# Patient Record
Sex: MALE | Race: WHITE | Employment: OTHER | URBAN - METROPOLITAN AREA
[De-identification: names, ages, dates, MRNs, and addresses within clinical notes are randomized per-mention and may not be internally consistent; named-entity substitution may affect disease eponyms.]

---

## 2017-11-29 PROBLEM — M25.519 SHOULDER PAIN: Status: ACTIVE | Noted: 2017-11-29

## 2017-11-29 PROBLEM — G56.31: Status: ACTIVE | Noted: 2017-11-29

## 2017-11-29 PROBLEM — E66.9 OBESITY: Status: ACTIVE | Noted: 2017-11-29

## 2018-04-04 PROBLEM — S22.41XA CLOSED FRACTURE OF MULTIPLE RIBS OF RIGHT SIDE: Status: ACTIVE | Noted: 2018-04-04

## 2018-04-04 PROBLEM — R39.9 LOWER URINARY TRACT SYMPTOMS: Status: ACTIVE | Noted: 2018-04-04

## 2018-04-16 ENCOUNTER — HOSPITAL ENCOUNTER (OUTPATIENT)
Dept: CT IMAGING | Age: 58
Discharge: HOME | End: 2018-04-16
Attending: INTERNAL MEDICINE
Payer: COMMERCIAL

## 2018-04-16 DIAGNOSIS — R10.31 RIGHT LOWER QUADRANT ABDOMINAL PAIN: ICD-10-CM

## 2018-04-16 PROBLEM — R74.01 TRANSAMINITIS: Status: ACTIVE | Noted: 2018-04-16

## 2018-04-16 PROBLEM — M62.08 DIASTASIS RECTI: Status: ACTIVE | Noted: 2018-04-16

## 2018-04-16 PROCEDURE — 74177 CT ABD & PELVIS W/CONTRAST: CPT

## 2018-04-16 PROCEDURE — 74011000258 HC RX REV CODE- 258: Performed by: INTERNAL MEDICINE

## 2018-04-16 PROCEDURE — 74011636320 HC RX REV CODE- 636/320: Performed by: INTERNAL MEDICINE

## 2018-04-16 RX ORDER — SODIUM CHLORIDE 0.9 % (FLUSH) 0.9 %
10 SYRINGE (ML) INJECTION
Status: COMPLETED | OUTPATIENT
Start: 2018-04-16 | End: 2018-04-16

## 2018-04-16 RX ADMIN — DIATRIZOATE MEGLUMINE AND DIATRIZOATE SODIUM 15 ML: 660; 100 LIQUID ORAL; RECTAL at 17:56

## 2018-04-16 RX ADMIN — SODIUM CHLORIDE 100 ML: 900 INJECTION, SOLUTION INTRAVENOUS at 17:56

## 2018-04-16 RX ADMIN — IOPAMIDOL 100 ML: 755 INJECTION, SOLUTION INTRAVENOUS at 17:56

## 2018-04-16 RX ADMIN — Medication 10 ML: at 17:56

## 2018-04-17 PROBLEM — K57.32 CECAL DIVERTICULITIS: Status: ACTIVE | Noted: 2018-04-16

## 2018-12-03 PROBLEM — R74.01 TRANSAMINITIS: Status: RESOLVED | Noted: 2018-04-16 | Resolved: 2018-12-03

## 2018-12-03 PROBLEM — K57.32 CECAL DIVERTICULITIS: Status: RESOLVED | Noted: 2018-04-16 | Resolved: 2018-12-03

## 2018-12-03 PROBLEM — S22.41XA CLOSED FRACTURE OF MULTIPLE RIBS OF RIGHT SIDE: Status: RESOLVED | Noted: 2018-04-04 | Resolved: 2018-12-03

## 2019-06-13 PROBLEM — Z87.19 HISTORY OF COLONIC DIVERTICULITIS: Status: ACTIVE | Noted: 2019-06-13

## 2020-03-06 ENCOUNTER — HOSPITAL ENCOUNTER (OUTPATIENT)
Dept: CT IMAGING | Age: 60
Discharge: HOME | End: 2020-03-06
Attending: INTERNAL MEDICINE
Payer: COMMERCIAL

## 2020-03-06 DIAGNOSIS — R10.31 RLQ ABDOMINAL PAIN: ICD-10-CM

## 2020-03-06 LAB — CREAT BLD-MCNC: 1.3 MG/DL (ref 0.8–1.5)

## 2020-03-06 PROCEDURE — 74011000258 HC RX REV CODE- 258: Performed by: INTERNAL MEDICINE

## 2020-03-06 PROCEDURE — 82565 ASSAY OF CREATININE: CPT

## 2020-03-06 PROCEDURE — 74177 CT ABD & PELVIS W/CONTRAST: CPT

## 2020-03-06 PROCEDURE — 74011250636 HC RX REV CODE- 250/636: Performed by: INTERNAL MEDICINE

## 2020-03-06 PROCEDURE — 74011636320 HC RX REV CODE- 636/320: Performed by: INTERNAL MEDICINE

## 2020-03-06 RX ORDER — HEPARIN 100 UNIT/ML
500 SYRINGE INTRAVENOUS ONCE
Status: COMPLETED | OUTPATIENT
Start: 2020-03-06 | End: 2020-03-06

## 2020-03-06 RX ORDER — SODIUM CHLORIDE 0.9 % (FLUSH) 0.9 %
10 SYRINGE (ML) INJECTION
Status: COMPLETED | OUTPATIENT
Start: 2020-03-06 | End: 2020-03-06

## 2020-03-06 RX ADMIN — DIATRIZOATE MEGLUMINE AND DIATRIZOATE SODIUM 15 ML: 660; 100 LIQUID ORAL; RECTAL at 13:19

## 2020-03-06 RX ADMIN — Medication 10 ML: at 13:19

## 2020-03-06 RX ADMIN — HEPARIN SODIUM (PORCINE) LOCK FLUSH IV SOLN 100 UNIT/ML 500 UNITS: 100 SOLUTION at 14:42

## 2020-03-06 RX ADMIN — IOPAMIDOL 100 ML: 755 INJECTION, SOLUTION INTRAVENOUS at 13:19

## 2020-03-06 RX ADMIN — SODIUM CHLORIDE 100 ML: 900 INJECTION, SOLUTION INTRAVENOUS at 13:19

## 2020-06-11 ENCOUNTER — HOSPITAL ENCOUNTER (OUTPATIENT)
Dept: CT IMAGING | Age: 60
Discharge: HOME | End: 2020-06-11
Attending: INTERNAL MEDICINE
Payer: COMMERCIAL

## 2020-06-11 DIAGNOSIS — R93.89 ABNORMAL CT SCAN: ICD-10-CM

## 2020-06-11 DIAGNOSIS — R19.7 DIARRHEA IN ADULT PATIENT: ICD-10-CM

## 2020-06-11 LAB — CREAT BLD-MCNC: 1.2 MG/DL (ref 0.8–1.5)

## 2020-06-11 PROCEDURE — 82565 ASSAY OF CREATININE: CPT

## 2020-06-11 PROCEDURE — 74011636320 HC RX REV CODE- 636/320: Performed by: INTERNAL MEDICINE

## 2020-06-11 PROCEDURE — 74011000258 HC RX REV CODE- 258: Performed by: INTERNAL MEDICINE

## 2020-06-11 PROCEDURE — 74177 CT ABD & PELVIS W/CONTRAST: CPT

## 2020-06-11 PROCEDURE — 74011000255 HC RX REV CODE- 255: Performed by: INTERNAL MEDICINE

## 2020-06-11 RX ORDER — SODIUM CHLORIDE 0.9 % (FLUSH) 0.9 %
10 SYRINGE (ML) INJECTION
Status: COMPLETED | OUTPATIENT
Start: 2020-06-11 | End: 2020-06-11

## 2020-06-11 RX ADMIN — Medication 10 ML: at 09:41

## 2020-06-11 RX ADMIN — IOPAMIDOL 100 ML: 755 INJECTION, SOLUTION INTRAVENOUS at 09:41

## 2020-06-11 RX ADMIN — BARIUM SULFATE 1350 ML: 1 SUSPENSION ORAL at 09:41

## 2020-06-11 RX ADMIN — SODIUM CHLORIDE 100 ML: 900 INJECTION, SOLUTION INTRAVENOUS at 09:41

## 2020-07-09 PROCEDURE — 88305 TISSUE EXAM BY PATHOLOGIST: CPT

## 2020-07-10 ENCOUNTER — HOSPITAL ENCOUNTER (OUTPATIENT)
Dept: LAB | Age: 60
Discharge: HOME | End: 2020-07-10

## 2020-07-23 ENCOUNTER — HOSPITAL ENCOUNTER (INPATIENT)
Age: 60
LOS: 7 days | Discharge: HOME | DRG: 177 | End: 2020-07-30
Attending: EMERGENCY MEDICINE | Admitting: HOSPITALIST
Payer: COMMERCIAL

## 2020-07-23 ENCOUNTER — APPOINTMENT (OUTPATIENT)
Dept: GENERAL RADIOLOGY | Age: 60
DRG: 177 | End: 2020-07-23
Attending: EMERGENCY MEDICINE
Payer: COMMERCIAL

## 2020-07-23 DIAGNOSIS — R09.02 HYPOXIA: ICD-10-CM

## 2020-07-23 DIAGNOSIS — U07.1 COVID-19 VIRUS INFECTION: ICD-10-CM

## 2020-07-23 DIAGNOSIS — J96.01 ACUTE RESPIRATORY FAILURE WITH HYPOXIA (HCC): ICD-10-CM

## 2020-07-23 DIAGNOSIS — D46.9 MDS (MYELODYSPLASTIC SYNDROME) (HCC): ICD-10-CM

## 2020-07-23 DIAGNOSIS — J12.82 PNEUMONIA DUE TO COVID-19 VIRUS: Primary | ICD-10-CM

## 2020-07-23 DIAGNOSIS — U07.1 COVID-19: ICD-10-CM

## 2020-07-23 DIAGNOSIS — E66.09 CLASS 1 OBESITY DUE TO EXCESS CALORIES WITHOUT SERIOUS COMORBIDITY WITH BODY MASS INDEX (BMI) OF 31.0 TO 31.9 IN ADULT: ICD-10-CM

## 2020-07-23 DIAGNOSIS — U07.1 PNEUMONIA DUE TO COVID-19 VIRUS: Primary | ICD-10-CM

## 2020-07-23 DIAGNOSIS — D64.9 ANEMIA, UNSPECIFIED TYPE: ICD-10-CM

## 2020-07-23 DIAGNOSIS — R91.8 PULMONARY INFILTRATES ON CXR: ICD-10-CM

## 2020-07-23 DIAGNOSIS — C90.01 MULTIPLE MYELOMA IN REMISSION (HCC): ICD-10-CM

## 2020-07-23 PROBLEM — R51.9 HEADACHE: Status: ACTIVE | Noted: 2020-07-23

## 2020-07-23 LAB
ALBUMIN SERPL-MCNC: 3 G/DL (ref 3.2–4.6)
ALBUMIN/GLOB SERPL: 0.8 {RATIO} (ref 1.2–3.5)
ALP SERPL-CCNC: 156 U/L (ref 50–136)
ALT SERPL-CCNC: 25 U/L (ref 12–65)
ANION GAP SERPL CALC-SCNC: 9 MMOL/L (ref 7–16)
AST SERPL-CCNC: 28 U/L (ref 15–37)
BASOPHILS # BLD: 0 K/UL (ref 0–0.2)
BASOPHILS NFR BLD: 0 % (ref 0–2)
BILIRUB SERPL-MCNC: 1 MG/DL (ref 0.2–1.1)
BUN SERPL-MCNC: 29 MG/DL (ref 8–23)
CALCIUM SERPL-MCNC: 8.9 MG/DL (ref 8.3–10.4)
CHLORIDE SERPL-SCNC: 107 MMOL/L (ref 98–107)
CO2 SERPL-SCNC: 24 MMOL/L (ref 21–32)
CREAT SERPL-MCNC: 1.5 MG/DL (ref 0.8–1.5)
CRP SERPL-MCNC: 19.9 MG/DL (ref 0–0.9)
DIFFERENTIAL METHOD BLD: ABNORMAL
EOSINOPHIL # BLD: 0 K/UL (ref 0–0.8)
EOSINOPHIL NFR BLD: 0 % (ref 0.5–7.8)
ERYTHROCYTE [DISTWIDTH] IN BLOOD BY AUTOMATED COUNT: 19.2 % (ref 11.9–14.6)
GLOBULIN SER CALC-MCNC: 3.9 G/DL (ref 2.3–3.5)
GLUCOSE SERPL-MCNC: 133 MG/DL (ref 65–100)
HCT VFR BLD AUTO: 23 % (ref 41.1–50.3)
HGB BLD-MCNC: 8.1 G/DL (ref 13.6–17.2)
IMM GRANULOCYTES # BLD AUTO: 0.1 K/UL (ref 0–0.5)
IMM GRANULOCYTES NFR BLD AUTO: 1 % (ref 0–5)
LYMPHOCYTES # BLD: 0.3 K/UL (ref 0.5–4.6)
LYMPHOCYTES NFR BLD: 3 % (ref 13–44)
MCH RBC QN AUTO: 33.3 PG (ref 26.1–32.9)
MCHC RBC AUTO-ENTMCNC: 35.2 G/DL (ref 31.4–35)
MCV RBC AUTO: 94.7 FL (ref 79.6–97.8)
MONOCYTES # BLD: 0.3 K/UL (ref 0.1–1.3)
MONOCYTES NFR BLD: 3 % (ref 4–12)
NEUTS SEG # BLD: 9.2 K/UL (ref 1.7–8.2)
NEUTS SEG NFR BLD: 93 % (ref 43–78)
NRBC # BLD: 0 K/UL (ref 0–0.2)
PLATELET # BLD AUTO: 184 K/UL (ref 150–450)
PMV BLD AUTO: 10.3 FL (ref 9.4–12.3)
POTASSIUM SERPL-SCNC: 3.8 MMOL/L (ref 3.5–5.1)
PROT SERPL-MCNC: 6.9 G/DL (ref 6.3–8.2)
RBC # BLD AUTO: 2.43 M/UL (ref 4.23–5.6)
SODIUM SERPL-SCNC: 140 MMOL/L (ref 136–145)
TROPONIN-HIGH SENSITIVITY: 7.8 PG/ML (ref 0–14)
WBC # BLD AUTO: 9.9 K/UL (ref 4.3–11.1)

## 2020-07-23 PROCEDURE — 84484 ASSAY OF TROPONIN QUANT: CPT

## 2020-07-23 PROCEDURE — 74011000258 HC RX REV CODE- 258: Performed by: HOSPITALIST

## 2020-07-23 PROCEDURE — 96374 THER/PROPH/DIAG INJ IV PUSH: CPT

## 2020-07-23 PROCEDURE — 74011250637 HC RX REV CODE- 250/637: Performed by: HOSPITALIST

## 2020-07-23 PROCEDURE — 99285 EMERGENCY DEPT VISIT HI MDM: CPT

## 2020-07-23 PROCEDURE — 86140 C-REACTIVE PROTEIN: CPT

## 2020-07-23 PROCEDURE — 86580 TB INTRADERMAL TEST: CPT | Performed by: HOSPITALIST

## 2020-07-23 PROCEDURE — 65270000029 HC RM PRIVATE

## 2020-07-23 PROCEDURE — 74011250636 HC RX REV CODE- 250/636: Performed by: EMERGENCY MEDICINE

## 2020-07-23 PROCEDURE — 85025 COMPLETE CBC W/AUTO DIFF WBC: CPT

## 2020-07-23 PROCEDURE — 93005 ELECTROCARDIOGRAM TRACING: CPT | Performed by: EMERGENCY MEDICINE

## 2020-07-23 PROCEDURE — 87040 BLOOD CULTURE FOR BACTERIA: CPT

## 2020-07-23 PROCEDURE — 74011000302 HC RX REV CODE- 302: Performed by: HOSPITALIST

## 2020-07-23 PROCEDURE — 74011250636 HC RX REV CODE- 250/636: Performed by: HOSPITALIST

## 2020-07-23 PROCEDURE — 71045 X-RAY EXAM CHEST 1 VIEW: CPT

## 2020-07-23 PROCEDURE — 80053 COMPREHEN METABOLIC PANEL: CPT

## 2020-07-23 RX ORDER — LORAZEPAM 0.5 MG/1
0.5 TABLET ORAL
Status: DISCONTINUED | OUTPATIENT
Start: 2020-07-23 | End: 2020-07-30 | Stop reason: HOSPADM

## 2020-07-23 RX ORDER — ACETAMINOPHEN 325 MG/1
650 TABLET ORAL
Status: DISCONTINUED | OUTPATIENT
Start: 2020-07-23 | End: 2020-07-30 | Stop reason: HOSPADM

## 2020-07-23 RX ORDER — TRAMADOL HYDROCHLORIDE 50 MG/1
50 TABLET ORAL
Status: DISCONTINUED | OUTPATIENT
Start: 2020-07-23 | End: 2020-07-30 | Stop reason: HOSPADM

## 2020-07-23 RX ORDER — UREA 10 %
220 LOTION (ML) TOPICAL DAILY
Status: DISCONTINUED | OUTPATIENT
Start: 2020-07-24 | End: 2020-07-25

## 2020-07-23 RX ORDER — ONDANSETRON 4 MG/1
4 TABLET, ORALLY DISINTEGRATING ORAL
Status: DISCONTINUED | OUTPATIENT
Start: 2020-07-23 | End: 2020-07-30 | Stop reason: HOSPADM

## 2020-07-23 RX ORDER — PANTOPRAZOLE SODIUM 40 MG/1
40 TABLET, DELAYED RELEASE ORAL
Status: DISCONTINUED | OUTPATIENT
Start: 2020-07-24 | End: 2020-07-30 | Stop reason: HOSPADM

## 2020-07-23 RX ORDER — SODIUM CHLORIDE 0.9 % (FLUSH) 0.9 %
5-40 SYRINGE (ML) INJECTION EVERY 8 HOURS
Status: DISCONTINUED | OUTPATIENT
Start: 2020-07-23 | End: 2020-07-30 | Stop reason: HOSPADM

## 2020-07-23 RX ORDER — FLUTICASONE PROPIONATE 50 MCG
2 SPRAY, SUSPENSION (ML) NASAL DAILY
Status: DISCONTINUED | OUTPATIENT
Start: 2020-07-24 | End: 2020-07-30 | Stop reason: HOSPADM

## 2020-07-23 RX ORDER — SODIUM CHLORIDE 0.9 % (FLUSH) 0.9 %
5-40 SYRINGE (ML) INJECTION AS NEEDED
Status: DISCONTINUED | OUTPATIENT
Start: 2020-07-23 | End: 2020-07-30 | Stop reason: HOSPADM

## 2020-07-23 RX ORDER — LORATADINE 10 MG/1
10 TABLET ORAL DAILY
Status: DISCONTINUED | OUTPATIENT
Start: 2020-07-24 | End: 2020-07-30 | Stop reason: HOSPADM

## 2020-07-23 RX ORDER — DEXAMETHASONE SODIUM PHOSPHATE 100 MG/10ML
6 INJECTION INTRAMUSCULAR; INTRAVENOUS DAILY
Status: DISCONTINUED | OUTPATIENT
Start: 2020-07-24 | End: 2020-07-30 | Stop reason: HOSPADM

## 2020-07-23 RX ORDER — DEXAMETHASONE SODIUM PHOSPHATE 100 MG/10ML
6 INJECTION INTRAMUSCULAR; INTRAVENOUS
Status: COMPLETED | OUTPATIENT
Start: 2020-07-23 | End: 2020-07-23

## 2020-07-23 RX ORDER — FOLIC ACID 1 MG/1
1 TABLET ORAL DAILY
Status: DISCONTINUED | OUTPATIENT
Start: 2020-07-24 | End: 2020-07-30 | Stop reason: HOSPADM

## 2020-07-23 RX ORDER — ALBUTEROL SULFATE 0.83 MG/ML
2.5 SOLUTION RESPIRATORY (INHALATION)
Status: DISCONTINUED | OUTPATIENT
Start: 2020-07-23 | End: 2020-07-30 | Stop reason: HOSPADM

## 2020-07-23 RX ORDER — DIPHENHYDRAMINE HCL 25 MG
25 CAPSULE ORAL
Status: DISCONTINUED | OUTPATIENT
Start: 2020-07-23 | End: 2020-07-30 | Stop reason: HOSPADM

## 2020-07-23 RX ORDER — BUDESONIDE 3 MG/1
9 CAPSULE, COATED PELLETS ORAL DAILY
Status: DISCONTINUED | OUTPATIENT
Start: 2020-07-24 | End: 2020-07-30 | Stop reason: HOSPADM

## 2020-07-23 RX ORDER — ASCORBIC ACID 500 MG
500 TABLET ORAL DAILY
Status: DISCONTINUED | OUTPATIENT
Start: 2020-07-24 | End: 2020-07-30 | Stop reason: HOSPADM

## 2020-07-23 RX ORDER — BISACODYL 5 MG
5 TABLET, DELAYED RELEASE (ENTERIC COATED) ORAL DAILY PRN
Status: DISCONTINUED | OUTPATIENT
Start: 2020-07-23 | End: 2020-07-30 | Stop reason: HOSPADM

## 2020-07-23 RX ORDER — MONTELUKAST SODIUM 4 MG/1
1 TABLET, CHEWABLE ORAL 2 TIMES DAILY
Status: DISCONTINUED | OUTPATIENT
Start: 2020-07-24 | End: 2020-07-30 | Stop reason: HOSPADM

## 2020-07-23 RX ADMIN — CEFTRIAXONE SODIUM 2 G: 2 INJECTION, POWDER, FOR SOLUTION INTRAMUSCULAR; INTRAVENOUS at 22:36

## 2020-07-23 RX ADMIN — AZITHROMYCIN MONOHYDRATE 500 MG: 500 INJECTION, POWDER, LYOPHILIZED, FOR SOLUTION INTRAVENOUS at 23:14

## 2020-07-23 RX ADMIN — Medication 10 ML: at 22:40

## 2020-07-23 RX ADMIN — TUBERCULIN PURIFIED PROTEIN DERIVATIVE 5 UNITS: 5 INJECTION, SOLUTION INTRADERMAL at 23:12

## 2020-07-23 RX ADMIN — ACETAMINOPHEN 650 MG: 325 TABLET, FILM COATED ORAL at 23:12

## 2020-07-23 RX ADMIN — LORAZEPAM 0.5 MG: 0.5 TABLET ORAL at 23:11

## 2020-07-23 RX ADMIN — DEXAMETHASONE SODIUM PHOSPHATE 6 MG: 10 INJECTION INTRAMUSCULAR; INTRAVENOUS at 20:17

## 2020-07-23 NOTE — ED TRIAGE NOTES
Pt ambulatory to triage wearing a mask. Pt was tested for COVID 07/16/20 and tested positive. Pt reports he's had symptoms since 07/14/20. Pt reports he has multiple myeloma and is still getting treatments. Pt reports increased SOB and coughing today. Pt's Sp02 85% on RA. Pt placed on 2 L oxygen via NC and Sp02 improves to 92%. Pt reports he has also had CP, body aches, fever, abdominal pain, diarrhea, headaches. Pt reports he contracted COVID from his wife who works at [employer]. Pt reports he had Tylenol Sinus about 1.5 hours ago. Pt reports he feels unable to take a deep breath \"because it just sends me into a coughing fit. \"

## 2020-07-24 LAB
ABO + RH BLD: NORMAL
ALBUMIN SERPL-MCNC: 2.8 G/DL (ref 3.2–4.6)
ALBUMIN/GLOB SERPL: 0.8 {RATIO} (ref 1.2–3.5)
ALP SERPL-CCNC: 149 U/L (ref 50–136)
ALT SERPL-CCNC: 24 U/L (ref 12–65)
ANION GAP SERPL CALC-SCNC: 10 MMOL/L (ref 7–16)
AST SERPL-CCNC: 29 U/L (ref 15–37)
ATRIAL RATE: 81 BPM
BASOPHILS # BLD: 0 K/UL (ref 0–0.2)
BASOPHILS NFR BLD: 0 % (ref 0–2)
BILIRUB SERPL-MCNC: 0.6 MG/DL (ref 0.2–1.1)
BLOOD GROUP ANTIBODIES SERPL: NORMAL
BUN SERPL-MCNC: 26 MG/DL (ref 8–23)
CALCIUM SERPL-MCNC: 8.7 MG/DL (ref 8.3–10.4)
CALCULATED P AXIS, ECG09: 44 DEGREES
CALCULATED R AXIS, ECG10: 2 DEGREES
CALCULATED T AXIS, ECG11: 40 DEGREES
CHLORIDE SERPL-SCNC: 109 MMOL/L (ref 98–107)
CO2 SERPL-SCNC: 23 MMOL/L (ref 21–32)
CREAT SERPL-MCNC: 1.13 MG/DL (ref 0.8–1.5)
CRP SERPL-MCNC: 20.8 MG/DL (ref 0–0.9)
DIAGNOSIS, 93000: NORMAL
DIFFERENTIAL METHOD BLD: ABNORMAL
EOSINOPHIL # BLD: 0 K/UL (ref 0–0.8)
EOSINOPHIL NFR BLD: 0 % (ref 0.5–7.8)
ERYTHROCYTE [DISTWIDTH] IN BLOOD BY AUTOMATED COUNT: 18.9 % (ref 11.9–14.6)
FERRITIN SERPL-MCNC: 1934 NG/ML (ref 8–388)
FIBRINOGEN PPP-MCNC: 771 MG/DL (ref 190–501)
GLOBULIN SER CALC-MCNC: 3.6 G/DL (ref 2.3–3.5)
GLUCOSE SERPL-MCNC: 140 MG/DL (ref 65–100)
HCT VFR BLD AUTO: 21.2 % (ref 41.1–50.3)
HCT VFR BLD AUTO: 23.8 % (ref 41.1–50.3)
HGB BLD-MCNC: 7.2 G/DL (ref 13.6–17.2)
HGB BLD-MCNC: 8.2 G/DL (ref 13.6–17.2)
IMM GRANULOCYTES # BLD AUTO: 0 K/UL (ref 0–0.5)
IMM GRANULOCYTES NFR BLD AUTO: 1 % (ref 0–5)
LDH SERPL L TO P-CCNC: 358 U/L (ref 110–210)
LYMPHOCYTES # BLD: 0.2 K/UL (ref 0.5–4.6)
LYMPHOCYTES NFR BLD: 3 % (ref 13–44)
MAGNESIUM SERPL-MCNC: 2.1 MG/DL (ref 1.8–2.4)
MCH RBC QN AUTO: 32.7 PG (ref 26.1–32.9)
MCHC RBC AUTO-ENTMCNC: 34 G/DL (ref 31.4–35)
MCV RBC AUTO: 96.4 FL (ref 79.6–97.8)
MM INDURATION POC: NORMAL (ref 0–5)
MONOCYTES # BLD: 0.2 K/UL (ref 0.1–1.3)
MONOCYTES NFR BLD: 3 % (ref 4–12)
NEUTS SEG # BLD: 6.4 K/UL (ref 1.7–8.2)
NEUTS SEG NFR BLD: 94 % (ref 43–78)
NRBC # BLD: 0 K/UL (ref 0–0.2)
P-R INTERVAL, ECG05: 122 MS
PLATELET # BLD AUTO: 175 K/UL (ref 150–450)
PMV BLD AUTO: 10.5 FL (ref 9.4–12.3)
POTASSIUM SERPL-SCNC: 4.2 MMOL/L (ref 3.5–5.1)
PPD POC: NORMAL
PROCALCITONIN SERPL-MCNC: 0.94 NG/ML
PROT SERPL-MCNC: 6.4 G/DL (ref 6.3–8.2)
Q-T INTERVAL, ECG07: 418 MS
QRS DURATION, ECG06: 128 MS
QTC CALCULATION (BEZET), ECG08: 485 MS
RBC # BLD AUTO: 2.2 M/UL (ref 4.23–5.6)
SODIUM SERPL-SCNC: 142 MMOL/L (ref 136–145)
SPECIMEN EXP DATE BLD: NORMAL
VENTRICULAR RATE, ECG03: 81 BPM
WBC # BLD AUTO: 6.8 K/UL (ref 4.3–11.1)

## 2020-07-24 PROCEDURE — 80053 COMPREHEN METABOLIC PANEL: CPT

## 2020-07-24 PROCEDURE — 86900 BLOOD TYPING SEROLOGIC ABO: CPT

## 2020-07-24 PROCEDURE — 85025 COMPLETE CBC W/AUTO DIFF WBC: CPT

## 2020-07-24 PROCEDURE — 85018 HEMOGLOBIN: CPT

## 2020-07-24 PROCEDURE — 84145 PROCALCITONIN (PCT): CPT

## 2020-07-24 PROCEDURE — 83615 LACTATE (LD) (LDH) ENZYME: CPT

## 2020-07-24 PROCEDURE — 85384 FIBRINOGEN ACTIVITY: CPT

## 2020-07-24 PROCEDURE — 65270000029 HC RM PRIVATE

## 2020-07-24 PROCEDURE — 74011250637 HC RX REV CODE- 250/637: Performed by: HOSPITALIST

## 2020-07-24 PROCEDURE — 74011000258 HC RX REV CODE- 258: Performed by: INTERNAL MEDICINE

## 2020-07-24 PROCEDURE — 74011250636 HC RX REV CODE- 250/636: Performed by: HOSPITALIST

## 2020-07-24 PROCEDURE — 86140 C-REACTIVE PROTEIN: CPT

## 2020-07-24 PROCEDURE — 82728 ASSAY OF FERRITIN: CPT

## 2020-07-24 PROCEDURE — 83735 ASSAY OF MAGNESIUM: CPT

## 2020-07-24 PROCEDURE — 74011250637 HC RX REV CODE- 250/637: Performed by: INTERNAL MEDICINE

## 2020-07-24 PROCEDURE — 74011250636 HC RX REV CODE- 250/636: Performed by: INTERNAL MEDICINE

## 2020-07-24 RX ORDER — FUROSEMIDE 10 MG/ML
40 INJECTION INTRAMUSCULAR; INTRAVENOUS 2 TIMES DAILY
Status: DISCONTINUED | OUTPATIENT
Start: 2020-07-24 | End: 2020-07-30 | Stop reason: HOSPADM

## 2020-07-24 RX ORDER — ENOXAPARIN SODIUM 100 MG/ML
40 INJECTION SUBCUTANEOUS EVERY 24 HOURS
Status: DISCONTINUED | OUTPATIENT
Start: 2020-07-24 | End: 2020-07-30 | Stop reason: HOSPADM

## 2020-07-24 RX ORDER — SODIUM CHLORIDE 9 MG/ML
250 INJECTION, SOLUTION INTRAVENOUS AS NEEDED
Status: DISCONTINUED | OUTPATIENT
Start: 2020-07-24 | End: 2020-07-30 | Stop reason: HOSPADM

## 2020-07-24 RX ORDER — POTASSIUM CHLORIDE 20 MEQ/1
40 TABLET, EXTENDED RELEASE ORAL
Status: COMPLETED | OUTPATIENT
Start: 2020-07-24 | End: 2020-07-24

## 2020-07-24 RX ADMIN — Medication 500 MG: at 08:07

## 2020-07-24 RX ADMIN — MULTIPLE VITAMINS W/ MINERALS TAB 1 TABLET: TAB at 08:07

## 2020-07-24 RX ADMIN — PANTOPRAZOLE SODIUM 40 MG: 40 TABLET, DELAYED RELEASE ORAL at 06:07

## 2020-07-24 RX ADMIN — FUROSEMIDE 40 MG: 10 INJECTION, SOLUTION INTRAMUSCULAR; INTRAVENOUS at 17:33

## 2020-07-24 RX ADMIN — BUDESONIDE 9 MG: 3 CAPSULE, GELATIN COATED ORAL at 08:07

## 2020-07-24 RX ADMIN — Medication 10 ML: at 06:06

## 2020-07-24 RX ADMIN — Medication 220 MG: at 08:07

## 2020-07-24 RX ADMIN — CEFTRIAXONE SODIUM 2 G: 2 INJECTION, POWDER, FOR SOLUTION INTRAMUSCULAR; INTRAVENOUS at 21:54

## 2020-07-24 RX ADMIN — DOXYCYCLINE 100 MG: 100 INJECTION, POWDER, LYOPHILIZED, FOR SOLUTION INTRAVENOUS at 08:07

## 2020-07-24 RX ADMIN — ACETAMINOPHEN 650 MG: 325 TABLET, FILM COATED ORAL at 17:38

## 2020-07-24 RX ADMIN — POTASSIUM CHLORIDE 40 MEQ: 20 TABLET, EXTENDED RELEASE ORAL at 08:07

## 2020-07-24 RX ADMIN — FLUTICASONE PROPIONATE 2 SPRAY: 50 SPRAY, METERED NASAL at 08:08

## 2020-07-24 RX ADMIN — FOLIC ACID 1 MG: 1 TABLET ORAL at 08:07

## 2020-07-24 RX ADMIN — LORAZEPAM 0.5 MG: 0.5 TABLET ORAL at 21:56

## 2020-07-24 RX ADMIN — LORATADINE 10 MG: 10 TABLET ORAL at 08:07

## 2020-07-24 RX ADMIN — FUROSEMIDE 40 MG: 10 INJECTION, SOLUTION INTRAMUSCULAR; INTRAVENOUS at 08:08

## 2020-07-24 RX ADMIN — ENOXAPARIN SODIUM 40 MG: 40 INJECTION SUBCUTANEOUS at 08:07

## 2020-07-24 RX ADMIN — Medication 10 ML: at 21:56

## 2020-07-24 RX ADMIN — Medication 10 ML: at 13:14

## 2020-07-24 RX ADMIN — DEXAMETHASONE SODIUM PHOSPHATE 6 MG: 10 INJECTION INTRAMUSCULAR; INTRAVENOUS at 08:08

## 2020-07-24 RX ADMIN — DOXYCYCLINE 100 MG: 100 INJECTION, POWDER, LYOPHILIZED, FOR SOLUTION INTRAVENOUS at 21:55

## 2020-07-24 NOTE — PROGRESS NOTES
Patient up in chair at this time, ambulated to BR for BM, appears less DURAN on 5L NC. Pt educated on and encouraged to self prone when back in bed.

## 2020-07-24 NOTE — H&P
Hospitalist H&P/Consult Note     Admit Date:  2020  7:37 PM   Name:  Jose Chavira   Age:  56 y.o.  :  1960   MRN:  [id number]   PCP:  Gordon Moise MD  Treatment Team: Attending Provider: Nikunj Hou MD    HPI:   Patient is a 55 y/o male with hx MDS, multiple myeloma, anemia, HLD presents to ED with worsening shortness of breath, hypoxia (RA 85%), tachypnea, worsening severe headache, loss of taste and smell. He was tested for Covid-19 on  and was positive. His initial symptoms actually started at least 2 weeks ago. Thinks he got it from his wife who works at [employer]. He received a blood transfusion for anemia yesterday. In ED he had tachypnea and diffuse rales. His chest xray shows diffuse infiltrates. Was started on IV decadron as well as rocephin/azithromycin. Hospitalist service consulted for admission. 10 systems reviewed and negative except as noted in HPI. Past Medical History:   Diagnosis Date    Allergic rhinitis 2015    Anemia     Constipation 2015    Depressive disorder 2015    Gastritis and gastroduodenitis 2015    Hyperglycemia 2015    Hyperlipidemia 2015    MDS [facility]myelodysplastic syndrome) ([facility] [address]     Pain in joint involving lower leg 2015    Plantar fasciitis 2015    Splenomegaly 2015      Past Surgical History:   Procedure Laterality Date    HX ORTHOPAEDIC Right     fx elbow/ \"replaced radial head\"    HX VASECTOMY        Prior to Admission Medications   Prescriptions Last Dose Informant Patient Reported? Taking? BUDESONIDE PO 2020 at Unknown time  Yes Yes   Sig: Take 9 mg by mouth daily. FOLIC ACID/MULTIVIT-MIN/LUTEIN (CENTRUM SILVER PO) 2020 at Unknown time  Yes Yes   Sig: Take 1 Tab by mouth daily. LORazepam (ATIVAN) 0.5 mg tablet 2020 at Unknown time  Yes Yes   Sig: Take 0.5 mg by mouth nightly.    azelastine (ASTELIN) 137 mcg (0.1 %) nasal spray 2020 at Unknown time  No Yes Sig: USE 2 SPRAYS IN EACH NOSTRIL TWICE DAILY   cetirizine (ZYRTEC) 10 mg tablet 2020 at Unknown time  Yes Yes   Sig: Take 10 mg by mouth nightly. colestipoL (COLESTID) 1 gram tablet 2020 at Unknown time  Yes Yes   Sig: Take 2 g by mouth two (2) times a day. darbepoetin steven in polysorbat (ARANESP, POLYSORBATE,) 200 mcg/mL Injection 2020 at Unknown time  No Yes   Si mL by SubCUTAneous route Once every 2 weeks. fluticasone propionate (FLONASE) 50 mcg/actuation nasal spray 2020 at Unknown time  No Yes   Sig: SHAKE LIQUID AND USE 2 SPRAYS IN EACH NOSTRIL EVERY NIGHT   folic acid (FOLVITE) 1 mg tablet 2020 at Unknown time  Yes Yes   Sig: Take 1 mg by mouth. 1 QD   ibuprofen (MOTRIN) 200 mg tablet 2020 at Unknown time  Yes Yes   Sig: Take 200 mg by mouth three (3) times daily as needed for Pain. naproxen sodium (ALEVE) 220 mg tablet 2020 at Unknown time  Yes Yes   Sig: Take 440 mg by mouth two (2) times daily as needed. omeprazole (PRILOSEC) 40 mg capsule 2020 at Unknown time  Yes Yes   Sig: Take 40 mg by mouth daily.       Facility-Administered Medications: None     No Known Allergies   Social History     Tobacco Use    Smoking status: Never Smoker    Smokeless tobacco: Never Used   Substance Use Topics    Alcohol use: No      Family History   Problem Relation Age of Onset    Cancer Father     Tuberculosis Other       Immunization History   Administered Date(s) Administered    Influenza Vaccine 10/31/2014, 2018, 09/15/2019    Influenza Vaccine (Quad) Mdck Pf 2017    Influenza Vaccine (Quad) PF 10/19/2016    Pneumococcal Conjugate (PCV-13) 2017    Pneumococcal Polysaccharide (PPSV-23) 2018    TB Skin Test (PPD) Intradermal 2015, 2020    Tdap 2018    Zoster Recombinant 2018, 2018       Objective:     Patient Vitals for the past 24 hrs:   Temp Pulse Resp BP SpO2   20 0257 98.2 °F (36.8 °C) 81 30 118/76 93 %   07/23/20 2340   30     07/23/20 2205 98.7 °F (37.1 °C) 91 (!) 34 146/82 91 %   07/23/20 2100  91 (!) 34 107/59 98 %   07/23/20 2046  88 (!) 32 101/59 98 %   07/23/20 2030  86 (!) 40 104/56 99 %   07/23/20 2015  91 (!) 34 109/58 99 %   07/23/20 2000  90 (!) 32 124/58 98 %   07/23/20 1946  83 (!) 35 107/58 97 %   07/23/20 1943     96 %   07/23/20 1941 98.4 °F (36.9 °C)       07/23/20 1940  78 (!) 34 106/57 97 %   07/23/20 1837 98.1 °F (36.7 °C) 84 24 107/59 (!) 85 %     Oxygen Therapy  O2 Sat (%): 93 % (07/24/20 0257)  Pulse via Oximetry: 92 beats per minute (07/23/20 2100)  O2 Device: Nasal cannula (07/23/20 2058)  O2 Flow Rate (L/min): 5 l/min (07/23/20 2058)  No intake or output data in the 24 hours ending 07/24/20 0558    Physical Exam:  General:    Well nourished. Alert. pale   Eyes:   Normal sclera. Extraocular movements intact. ENT:  Normocephalic, atraumatic. Moist mucous membranes  CV:   RRR. No murmur, rub, or gallop. Lungs:  tachypneic with diffuse rales bilaterally  Abdomen: Soft, nontender, nondistended. Bowel sounds normal.   Extremities: Warm and dry. No cyanosis or edema. Neurologic: CN II-XII grossly intact. Sensation intact. Skin:     No rashes or jaundice. No wounds. Psych:  Normal mood and distressed affect. I reviewed the labs, imaging, EKGs, telemetry, and other studies done this admission.   Data Review:   Recent Results (from the past 24 hour(s))   CBC WITH AUTOMATED DIFF    Collection Time: 07/23/20  6:42 PM   Result Value Ref Range    WBC 9.9 4.3 - 11.1 K/uL    RBC 2.43 (L) 4.23 - 5.6 M/uL    HGB 8.1 (L) 13.6 - 17.2 g/dL    HCT 23.0 (L) 41.1 - 50.3 %    MCV 94.7 79.6 - 97.8 FL    MCH 33.3 (H) 26.1 - 32.9 PG    MCHC 35.2 (H) 31.4 - 35.0 g/dL    RDW 19.2 (H) 11.9 - 14.6 %    PLATELET 415 283 - 975 K/uL    MPV 10.3 9.4 - 12.3 FL    ABSOLUTE NRBC 0.00 0.0 - 0.2 K/uL    NEUTROPHILS 93 (H) 43 - 78 %    LYMPHOCYTES 3 (L) 13 - 44 %    MONOCYTES 3 (L) 4.0 - 12.0 %    EOSINOPHILS 0 (L) 0.5 - 7.8 %    BASOPHILS 0 0.0 - 2.0 %    IMMATURE GRANULOCYTES 1 0.0 - 5.0 %    ABS. NEUTROPHILS 9.2 (H) 1.7 - 8.2 K/UL    ABS. LYMPHOCYTES 0.3 (L) 0.5 - 4.6 K/UL    ABS. MONOCYTES 0.3 0.1 - 1.3 K/UL    ABS. EOSINOPHILS 0.0 0.0 - 0.8 K/UL    ABS. BASOPHILS 0.0 0.0 - 0.2 K/UL    ABS. IMM. GRANS. 0.1 0.0 - 0.5 K/UL    DF AUTOMATED     METABOLIC PANEL, COMPREHENSIVE    Collection Time: 07/23/20  6:42 PM   Result Value Ref Range    Sodium 140 136 - 145 mmol/L    Potassium 3.8 3.5 - 5.1 mmol/L    Chloride 107 98 - 107 mmol/L    CO2 24 21 - 32 mmol/L    Anion gap 9 7 - 16 mmol/L    Glucose 133 (H) 65 - 100 mg/dL    BUN 29 (H) 8 - 23 MG/DL    Creatinine 1.50 0.8 - 1.5 MG/DL    GFR est AA >60 >60 ml/min/1.73m2    GFR est non-AA 50 (L) >60 ml/min/1.73m2    Calcium 8.9 8.3 - 10.4 MG/DL    Bilirubin, total 1.0 0.2 - 1.1 MG/DL    ALT (SGPT) 25 12 - 65 U/L    AST (SGOT) 28 15 - 37 U/L    Alk. phosphatase 156 (H) 50 - 136 U/L    Protein, total 6.9 6.3 - 8.2 g/dL    Albumin 3.0 (L) 3.2 - 4.6 g/dL    Globulin 3.9 (H) 2.3 - 3.5 g/dL    A-G Ratio 0.8 (L) 1.2 - 3.5     TROPONIN-HIGH SENSITIVITY    Collection Time: 07/23/20  6:42 PM   Result Value Ref Range    Troponin-High Sensitivity 7.8 0 - 14 pg/mL   C REACTIVE PROTEIN, QT    Collection Time: 07/23/20  6:42 PM   Result Value Ref Range    C-Reactive protein 19.9 (H) 0.0 - 0.9 mg/dL   EKG, 12 LEAD, INITIAL    Collection Time: 07/23/20  7:40 PM   Result Value Ref Range    Ventricular Rate 81 BPM    Atrial Rate 81 BPM    P-R Interval 122 ms    QRS Duration 128 ms    Q-T Interval 418 ms    QTC Calculation (Bezet) 485 ms    Calculated P Axis 44 degrees    Calculated R Axis 2 degrees    Calculated T Axis 40 degrees    Diagnosis       !! AGE AND GENDER SPECIFIC ECG ANALYSIS !!   Sinus rhythm with occasional Premature ventricular complexes  Right bundle branch block  Abnormal ECG  No previous ECGs available  Confirmed by BARBARA BANERJEE (), Balwinder Goodson ([id number]) on 7/24/2020 5:56:50 AM         Imaging Studies:  CXR Results  (Last 48 hours)               07/1960  XR CHEST SNGL V Final result    Impression:  IMPRESSION:   1. Multifocal airspace consolidations with mid and lower lung predominance   highly suspicious for atypical multifocal pneumonia. 2. Diffuse pulmonary interstitial edema. 3. Right-sided chest port tip terminating in the cavoatrial junction. Narrative:  EXAM: Single view chest radiograph. INDICATION: Shortness of breath and chest pain, Covid +   COMPARISON: None. FINDINGS:   Right-sided chest port tip terminating in the cavoatrial junction. No   pneumothorax or pleural effusion. Diffuse pulmonary interstitial edema. Multifocal pulmonary consolidations with mid and lower lung predominance.                CT Results  (Last 48 hours)    None          Assessment and Plan:     Hospital Problems as of 7/24/2020 Date Reviewed: 7/20/2020          Codes Class Noted - Resolved POA    * (Principal) Acute respiratory failure with hypoxia ([facility] ICD-10-CM: J96.01  ICD-9-CM: 518.81  7/23/2020 - Present Yes        COVID-19 virus infection ICD-10-CM: U07.1  ICD-9-CM: 079.89  7/23/2020 - Present Yes        MDS (myelodysplastic syndrome) (HCC) ICD-10-CM: D46.9  ICD-9-CM: 238.75  Unknown - Present Yes        Pulmonary infiltrates on CXR ICD-10-CM: R91.8  ICD-9-CM: 793.19  7/23/2020 - Present Yes        Headache ICD-10-CM: R51  ICD-9-CM: 784.0  7/23/2020 - Present Yes        Obesity ICD-10-CM: E66.9  ICD-9-CM: 278.00  11/29/2017 - Present Yes        Multiple myeloma ([facility] ICD-10-CM: C90.00  ICD-9-CM: 203.00  7/1/2016 - Present Yes              PLAN:  · Admit inpatient to remote telemetry on Covid floor  · Droplet plus precautions  · Patient has acute respiratory failure and hypoxia due to Covid-19  · He has already been started on IV dexamethasone in ED  · Will continue 6 mg IV daily  · Start vitamin C and zinc  · Supplemental O2 to maintain sats 91% or greater  · Albuterol nebulizers as needed. Incentive spirometry  · Monitor CBC.  Type and screen  · Blood transfusions as needed  · Check Covid inflammatory markers  · Will need to see if qualifies for remdesavir therapy  · If respiratory status declines consult Pulmonary  · Consult Hematology for convalescent plasma and for his MDS and myeloma  · Continue IV rocephin and azithromycin for pulmonary infiltrates  · Antipyretics and antiemetics prn  · Tylenol and tramadol for headache  · Place SCDs for now for DVT prophylaxis      Estimated LOS:  Greater than 2 midnights    Signed:  Agustin Kee MD

## 2020-07-24 NOTE — PROGRESS NOTES
Hospitalist Progress Note    2020  Admit Date: 2020  7:37 PM   NAME: Maddy Jones   :  1960   MRN:  [id number]   Attending: Sharlene Pearson DO  PCP:  Chika Fernández MD    SUBJECTIVE:   Patient is a 55 y/o male with hx MDS, multiple myeloma, anemia, HLD presents to ED with worsening shortness of breath, hypoxia (RA 85%), tachypnea, worsening severe headache, loss of taste and smell. He was tested for Covid-19 on  and was positive. His initial symptoms actually started at least 2 weeks ago. Thinks he got it from his wife who works at [employer]. He received a blood transfusion for anemia yesterday. In ED he had tachypnea and diffuse rales. His chest xray shows diffuse infiltrates. Was started on IV decadron as well as rocephin/azithromycin. Hospitalist service consulted for admission. Interval History (): patient examined at bedside. No acute events since admission. Feels \"about the same\" as when he was admitted. Has some chest discomfort \"when I cough a lot. \" No fevers/chills or abdominal pain.      Review of Systems negative with exception of pertinent positives noted above  PHYSICAL EXAM     Visit Vitals  /62   Pulse 86   Temp 98.2 °F (36.8 °C)   Resp 20   Ht 6' (1.829 m)   Wt 104.3 kg (230 lb)   SpO2 91% Comment: 5L NC   BMI 31.19 kg/m²      Temp (24hrs), Av.3 °F (36.8 °C), Min:98.1 °F (36.7 °C), Max:98.7 °F (37.1 °C)    Oxygen Therapy  O2 Sat (%): 91 %(5L NC) (20 1123)  Pulse via Oximetry: 92 beats per minute (20)  O2 Device: Nasal cannula (20)  O2 Flow Rate (L/min): 5 l/min (20)    Intake/Output Summary (Last 24 hours) at 2020 1310  Last data filed at 2020 1102  Gross per 24 hour   Intake 240 ml   Output 1300 ml   Net -1060 ml      General: No acute distress    Lungs:  Diffuse crackles on anterior auscultation, dyspneic   Heart:  Regular rate and rhythm,  No murmur, rub, or gallop  Abdomen: Soft, Non distended, Non tender, Positive bowel sounds  Extremities: No cyanosis, clubbing or edema  Neurologic:  No focal deficits    ASSESSMENT      Active Hospital Problems    Diagnosis Date Noted    Acute respiratory failure with hypoxia ([facility] [address] 07/23/2020    COVID-19 virus infection 07/23/2020    Headache 07/23/2020    Pulmonary infiltrates on CXR 07/23/2020    Obesity 11/29/2017    Multiple myeloma ([facility] [address] 07/01/2016    MDS (myelodysplastic syndrome) ([facility] [address]      Plan:    # Acute hypoxic respiratory failure, multifactorial  - positive for COVID 19  - started on Decadron 6 mg IV  - consent signed for convalescent plasma, ordered and pending  - consult ID to see if candidate for remdesevir  - received Rocephin/azithromycin in ED for CAP coverage  - continue Rocephin  - switch azithromycin to doxycycline due to prolonged QT interval on EKG  - diffuse pulmonary edema on imaging, start Lasix 40 mg IV BID  - patient also has received several blood transfusions this past week  - ordered TTE, patient with prior echocardiogram in 2016 showing Grade I diastolic dysfunction  - strict I's/O's  - if continued worsening hypoxemia, would get CT chest  - wean supplemental oxygen as tolerated  - serial Hgb/Hct    # History of MDS/myeloma  - gets chronic blood transfusions  - no obvious signs of bleeding  - trend Hgb/Hct  - transfuse for Hgb<7  - has received several blood transfusions recently    F/E/N: no fluids, replete electrolytes as needed, regular diet    Ppx: Lovenox for VTE    Code Status: FULL CODE    Disposition: pending clinical improvement with plan as above. Discussed with patient at bedside. All questions answered.      Signed By: Marli Mendoza DO     July 24, 2020

## 2020-07-24 NOTE — PROGRESS NOTES
Patient arrived room 502 via stretcher. Respirations are even and unlabored. O2 at 5lpm NC. Patient is alert and oriented. Dual skin assessment completed with [facility]. No skin break down noted. Patient is stable. Bed is low, locked and call light within reach.

## 2020-07-24 NOTE — ED NOTES
TRANSFER - OUT REPORT:    Verbal report given to Chelly(name) on [facility]  being transferred to [facility](unit) for routine progression of care       Report consisted of patients Situation, Background, Assessment and   Recommendations(SBAR). Information from the following report(s) SBAR was reviewed with the receiving nurse. Lines:   Venous Access Device Power loc ken needle 03/06/20 Upper chest (subclavicular area, right (Active)       Venous Access Device 07/23/20 Upper chest (subclavicular area, right (Active)        Opportunity for questions and clarification was provided.       Patient transported with:   O2 @ 5 liters

## 2020-07-24 NOTE — PROGRESS NOTES
SW reviewed patient's chart and contacted patient via telephone call to room 502 to discuss DC planning. Patient admitted by hospitalist service on 7/23/20 due to acute respiratory failure with hypoxia related to [address]. SOCIAL:  Patient lives with wife in a multi-leveled home with all needs accessible from ground level. He has 3 adult children, all of whom live outside of the local area. No additional social support identified. PCP confirmed as Tonya Ayala. Insurance confirmed as [employer]. No reported difficulty affording medications normally. Source of income: pt / spouse employed. No HCPOA or advance directive documents. Wife is next-of-kin. MOBILITY / HISTORY:  At baseline, patient ambulates and manages ADLs with independence. He drives himself. DME at home - cane. No home O2 at baseline. No HD history. No privately paid sitters, aids, caregivers, or CLTC hours. No HH or STR history. PLAN FOR DISCHARGE:  Anticipate return to home when medically ready with no additional needs. SW will monitor for new needs, including possible home O2 (patient on 5L during this admission). Care Management Interventions  PCP Verified by CM: Yes  Mode of Transport at Discharge: Other (see comment)  Transition of Care Consult (CM Consult): Other(No consult)  Discharge Durable Medical Equipment: No  Physical Therapy Consult: No  Occupational Therapy Consult: No  Speech Therapy Consult: No  Current Support Network: Lives with Spouse, Own Home  Confirm Follow Up Transport: Family  The Plan for Transition of Care is Related to the Following Treatment Goals : Return to home when medically ready.   Discharge Location  Discharge Placement: Home

## 2020-07-24 NOTE — ED PROVIDER NOTES
69-year-old male with history of mild myelodysplastic syndrome, multiple myeloma, CKD presents with complaint of worsening shortness of breath over the past several days. States that he initially began having fever, chills, cough, myalgias, shortness of breath 2 weeks ago. States that he was tested last Thursday for COVID and was informed on this past Monday in regards to positive result. Patient states that he got tested in [place] in urgent care. States that today he noticed increased shortness of breath and nonproductive cough. Patient was noted to have hypoxia with O2 sat of 85%. Patient was placed initially on 2 L O2 w/ sats ~92%. Patient reports bilateral chest wall pain from coughing, diffuse generalized headache without radiation. Patient denies neck pain, nausea, vomiting, urinary symptoms. States that he contracted COVID from his wife who works at [facility] [employer] in [place]. Patient states that he received transfusion of 2 units of packed red blood cells on yesterday. The history is provided by the patient. No  was used. Shortness of Breath   This is a new problem. The current episode started more than 2 days ago. The problem has not changed since onset. Associated symptoms include a fever, headaches and cough. Pertinent negatives include no coryza, no rhinorrhea, no sore throat, no swollen glands, no sputum production, no hemoptysis, no wheezing, no PND, no orthopnea, no vomiting, no abdominal pain, no rash, no leg pain, no leg swelling and no claudication. He has tried nothing for the symptoms. The treatment provided no relief.         Past Medical History:   Diagnosis Date    Allergic rhinitis 11/4/2015    Anemia     Constipation 11/4/2015    Depressive disorder 11/4/2015    Gastritis and gastroduodenitis 11/4/2015    Hyperglycemia 11/4/2015    Hyperlipidemia 11/4/2015    MDS (myelodysplastic syndrome) (HCC)     Pain in joint involving lower leg 11/4/2015    Plantar fasciitis 11/4/2015    Splenomegaly 11/4/2015       Past Surgical History:   Procedure Laterality Date    HX ORTHOPAEDIC Right     fx elbow/ \"replaced radial head\"    HX VASECTOMY           Family History:   Problem Relation Age of Onset    Cancer Father     Tuberculosis Other        Social History     Socioeconomic History    Marital status:      Spouse name: Not on file    Number of children: Not on file    Years of education: Not on file    Highest education level: Not on file   Occupational History    Not on file   Social Needs    Financial resource strain: Not on file    Food insecurity     Worry: Not on file     Inability: Not on file    Transportation needs     Medical: Not on file     Non-medical: Not on file   Tobacco Use    Smoking status: Never Smoker    Smokeless tobacco: Never Used   Substance and Sexual Activity    Alcohol use: No    Drug use: No    Sexual activity: Not on file   Lifestyle    Physical activity     Days per week: Not on file     Minutes per session: Not on file    Stress: Not on file   Relationships    Social connections     Talks on phone: Not on file     Gets together: Not on file     Attends [employer] service: Not on file     Active member of club or organization: Not on file     Attends meetings of clubs or organizations: Not on file     Relationship status: Not on file    Intimate partner violence     Fear of current or ex partner: Not on file     Emotionally abused: Not on file     Physically abused: Not on file     Forced sexual activity: Not on file   Other Topics Concern    Not on file   Social History Narrative    Not on file         ALLERGIES: Patient has no known allergies. Review of Systems   Constitutional: Positive for chills, fatigue and fever. HENT: Negative for congestion, rhinorrhea and sore throat. Respiratory: Positive for cough and shortness of breath. Negative for hemoptysis, sputum production and wheezing.     Cardiovascular: Negative for orthopnea, claudication, leg swelling and PND. Gastrointestinal: Negative for abdominal pain, constipation, diarrhea, nausea and vomiting. Genitourinary: Negative for dysuria and flank pain. Musculoskeletal: Positive for myalgias. Negative for arthralgias and neck stiffness. Skin: Negative for rash. Neurological: Positive for headaches. Negative for dizziness, weakness and light-headedness. Psychiatric/Behavioral: Negative for confusion. Vitals:    07/23/20 1837 07/23/20 1941 07/23/20 1943   BP: 107/59     Pulse: 84     Resp: 24     Temp: 98.1 °F (36.7 °C) 98.4 °F (36.9 °C)    SpO2: (!) 85%  96%   Weight: 104.3 kg (230 lb)     Height: 6' (1.829 m)              Physical Exam  Vitals signs and nursing note reviewed. Constitutional:       Comments: Ill-appearing. Eyes:      Extraocular Movements: Extraocular movements intact. Pupils: Pupils are equal, round, and reactive to light. Neck:      Musculoskeletal: Neck supple. Vascular: No JVD. Comments: No nuchal rigidity. Cardiovascular:      Rate and Rhythm: Normal rate and regular rhythm. Pulses: Normal pulses. Heart sounds: Normal heart sounds. Comments: Pulses 2+ and equal throughout. Pulmonary:      Effort: Tachypnea present. Comments: Tachypneic. Diminished breath sounds bilaterally. Abdominal:      Palpations: Abdomen is soft. Comments: Soft, NTND. No rebound or guarding. No CVAT. Musculoskeletal:      Comments: No LE edema. No calf TTP. Skin:     Findings: No erythema or rash. Neurological:      General: No focal deficit present. Mental Status: He is alert and oriented to person, place, and time. Comments: No focal deficits.           MDM  Number of Diagnoses or Management Options  COVID-19: new and requires workup  Hypoxia: new and requires workup  Pneumonia due to COVID-19 virus: new and requires workup  Diagnosis management comments: Patient presents with hypoxia and worsening shortness of breath after recently testing positive for COVID. Patient currently requiring 5 L O2 via nasal cannula. Chest x-ray with concerning bilateral infiltrates concerning for COVID pneumonia. Order for Decadron 6 mg IV ordered. Hospitalist consulted for admission. Amount and/or Complexity of Data Reviewed  Clinical lab tests: ordered and reviewed  Tests in the radiology section of CPT®: ordered and reviewed  Tests in the medicine section of CPT®: ordered and reviewed  Review and summarize past medical records: yes  Discuss the patient with other providers: yes  Independent visualization of images, tracings, or specimens: yes    Risk of Complications, Morbidity, and/or Mortality  Presenting problems: high  Diagnostic procedures: high  Management options: high    Patient Progress  Patient progress: stable         EKG    Date/Time: 7/23/2020 8:33 PM  Performed by: Teto Cueto MD  Authorized by: Teto Cueto MD     ECG reviewed by ED Physician in the absence of a cardiologist: yes    Rate:     ECG rate:  91    ECG rate assessment: normal    Rhythm:     Rhythm: sinus rhythm    Ectopy:     Ectopy: none    Conduction:     Conduction: abnormal      Abnormal conduction: complete RBBB    ST segments:     ST segments:  Normal  T waves:     T waves: normal          XR CHEST SNGL V   Final Result   IMPRESSION:   1. Multifocal airspace consolidations with mid and lower lung predominance   highly suspicious for atypical multifocal pneumonia. 2. Diffuse pulmonary interstitial edema. 3. Right-sided chest port tip terminating in the cavoatrial junction.                Results Include:    Recent Results (from the past 24 hour(s))   CBC WITH AUTOMATED DIFF    Collection Time: 07/23/20  6:42 PM   Result Value Ref Range    WBC 9.9 4.3 - 11.1 K/uL    RBC 2.43 (L) 4.23 - 5.6 M/uL    HGB 8.1 (L) 13.6 - 17.2 g/dL    HCT 23.0 (L) 41.1 - 50.3 %    MCV 94.7 79.6 - 97.8 FL    MCH 33.3 (H) 26.1 - 32.9 PG    MCHC 35.2 (H) 31.4 - 35.0 g/dL    RDW 19.2 (H) 11.9 - 14.6 %    PLATELET 834 497 - 588 K/uL    MPV 10.3 9.4 - 12.3 FL    ABSOLUTE NRBC 0.00 0.0 - 0.2 K/uL    NEUTROPHILS 93 (H) 43 - 78 %    LYMPHOCYTES 3 (L) 13 - 44 %    MONOCYTES 3 (L) 4.0 - 12.0 %    EOSINOPHILS 0 (L) 0.5 - 7.8 %    BASOPHILS 0 0.0 - 2.0 %    IMMATURE GRANULOCYTES 1 0.0 - 5.0 %    ABS. NEUTROPHILS 9.2 (H) 1.7 - 8.2 K/UL    ABS. LYMPHOCYTES 0.3 (L) 0.5 - 4.6 K/UL    ABS. MONOCYTES 0.3 0.1 - 1.3 K/UL    ABS. EOSINOPHILS 0.0 0.0 - 0.8 K/UL    ABS. BASOPHILS 0.0 0.0 - 0.2 K/UL    ABS. IMM. GRANS. 0.1 0.0 - 0.5 K/UL    DF AUTOMATED     METABOLIC PANEL, COMPREHENSIVE    Collection Time: 07/23/20  6:42 PM   Result Value Ref Range    Sodium 140 136 - 145 mmol/L    Potassium 3.8 3.5 - 5.1 mmol/L    Chloride 107 98 - 107 mmol/L    CO2 24 21 - 32 mmol/L    Anion gap 9 7 - 16 mmol/L    Glucose 133 (H) 65 - 100 mg/dL    BUN 29 (H) 8 - 23 MG/DL    Creatinine 1.50 0.8 - 1.5 MG/DL    GFR est AA >60 >60 ml/min/1.73m2    GFR est non-AA 50 (L) >60 ml/min/1.73m2    Calcium 8.9 8.3 - 10.4 MG/DL    Bilirubin, total 1.0 0.2 - 1.1 MG/DL    ALT (SGPT) 25 12 - 65 U/L    AST (SGOT) 28 15 - 37 U/L    Alk. phosphatase 156 (H) 50 - 136 U/L    Protein, total 6.9 6.3 - 8.2 g/dL    Albumin 3.0 (L) 3.2 - 4.6 g/dL    Globulin 3.9 (H) 2.3 - 3.5 g/dL    A-G Ratio 0.8 (L) 1.2 - 3.5              Crystal Dan MD; 7/23/2020 @8:15 PM Voice dictation software was used during the making of this note. This software is not perfect and grammatical and other typographical errors may be present.   This note has not been proofread for errors.  ===================================================================

## 2020-07-24 NOTE — PROGRESS NOTES
Patient alert and oriented x4,SaO2 90% on 5L NC, significant DURAN when ambulating to bathroom, bilateral crackles auscultated in bases. Apetite fair, pt denies pain. Encouraged to use urinal and call for assistance. Wife, Joe Amor, updated via telephone on pt status. Per her, pt has had 3units of PRBCs transfused over the last week and she is very concerned about rapid drops in Hgb.  Will consult MD.

## 2020-07-24 NOTE — ACP (ADVANCE CARE PLANNING)
SW completed ACP discussion with patient on this date via telephone call to room 502. Patient does not have HCPOA or advance directive documents and declined offer for assistance in completing documents during this admission. His responses to healthcare preference questions are provided below. Advance Care Planning     Advance Care Planning Activator (Inpatient)  Conversation Note      Date of ACP Conversation: 07/24/20     Conversation Conducted with:   Patient with Decision Making Capacity    ACP Activator: [facility] makes decisions on behalf of the incapacitated patient: Decision Maker is asked to consider and make decisions based on patient values, known preferences, or best interests. Health Care Decision Maker:    Current Designated Health Care Decision Maker:   Primary Decision Maker: Jessy Tristan - [phone]  (If there is a [address] named in the [facility] Makers\" box in the ACP activity, but it is not visible above, be sure to open that field and then select the health care decision maker relationship (ie \"primary\") in the blank space to the right of the name.) Validate  this information as still accurate & up-to-date; edit [place] field as needed.)    Note: Assess and validate information in current ACP documents, as indicated. If no Decision Maker listed above or available through scanned documents, then:    If no Authorized Decision Maker has previously been identified, then patient chooses [place]:  \"Who would you like to name as your primary health care decision-maker? \"    Name: Tc Amin   Relationship: wife Phone number: [phone]  Ramon Vasquez this person be reached easily? \" YES  \"Who would you like to name as your back-up decision maker? \"   Patient declined.     Note: If the relationship of these Decision-Makers to the patient does NOT follow your state's Next of Kin hierarchy, recommend that patient complete ACP document that meets state-specific requirements to allow them to act on the patient's behalf when appropriate. Care Preferences    Ventilation: \"If you were in your present state of health and suddenly became very ill and were unable to breathe on your own, what would your preference be about the use of a ventilator (breathing machine) if it were available to you? \"      If patient would desire the use of a ventilator (breathing machine), answer \"yes\", if not \"no\": YES    \"If your health worsens and it becomes clear that your chance of recovery is unlikely, what would your preference be about the use of a ventilator (breathing machine) if it were available to you? \"     Would the patient desire the use of a ventilator (breathing machine)? YES      Resuscitation  \"CPR works best to restart the heart when there is a sudden event, like a heart attack, in someone who is otherwise healthy. Unfortunately, CPR does not typically restart the heart for people who have serious health conditions or who are very sick. \"    \"In the event your heart stopped as a result of an underlying serious health condition, would you want attempts to be made to restart your heart (answer \"yes\" for attempt to resuscitate) or would you prefer a natural death (answer \"no\" for do not attempt to resuscitate)? \" yes      NOTE: If the patient has a valid advance directive AND now provides care preference(s) that are inconsistent with that prior directive, advise the patient to consider either: creating a new advance directive that complies with state-specific requirements; or, if that is not possible, orally revoking that prior directive in accordance with state-specific requirements, which must be documented in the EHR. [x] Yes  [] No   Educated Patient / Bess Smitheling regarding differences between Advance Directives and portable DNR orders.     Length of ACP Conversation in minutes: 10    Conversation Outcomes:  [x] ACP discussion completed  [] Existing advance directive reviewed with patient; no changes to patient's previously recorded wishes     [] New Advance Directive completed   [] Portable Do Not Resuscitate prepared for Provider review and signature  [] POLST/POST/MOLST/MOST prepared for Provider review and signature      Follow-up plan:    [] Schedule follow-up conversation to continue planning  [] Referred individual to Provider for additional questions/concerns   [] Advised patient/agent/surrogate to review completed ACP document and update if needed with changes in condition, patient preferences or care setting     [] This note routed to one or more involved healthcare providers  NO ADDITIONAL FOLLOW UP INDICATED AT THIS TIME. These are patient's current wishes as discussed today and are not intended to take place of [address]

## 2020-07-24 NOTE — CONSULTS
Infectious Disease Non-face to Face Encounter    Today's date: 7/24/2020  Admit date: 7/23/2020    Impression:     1. COVID-19 infection -dx 7/13  2. Acute hypoxic respiratory failure - on 5L  3. Multiple myeloma    Recommendations:     Transfuse convalescent plasma  Cont dexamethasone 6mg IV Q24 daily x 10 days  Not a remdesivir candidate due to unlikely to benefit given > 7 days since diagnosis  Prone posititioning  Trend inflammatory markers  Ok to continue rocephin doxy x 5 days    Anti-Infectives:      Rocephin doxy 7/23 - current    Clinical Synopsis:     Dr. Aimee Swift requesting ID opinion/advice to assist on the care and management of this patient. Patient's chart was reviewed including relevant notes, labs, imaging, medications, pathology, PMH, PSH, FH, and SH. Briefly, patient 56 yoM diagnosed with COVID 7/13, now with SOB, requring 5 L nc      Allergies:  No Known Allergies    Anticoagulants:  Key Anti-Platelet Anticoagulant Meds     The patient is on no antiplatelet meds or anticoagulants.            Objective:     Physical Exam:    Vitals:   Current   Temperature: 98.2 °F (36.8 °C)   Heart Rate: 86   Resp Rate: 20   Blood Pressure: 108/62   Oxygen Sats: 91 %(5L NC)         Patient Vitals for the past 12 hrs:   Temp Pulse Resp BP SpO2   07/24/20 1123 98.2 °F (36.8 °C) 86 20 108/62 91 %   07/24/20 0734 98.2 °F (36.8 °C) 85 20 109/64 90 %   07/24/20 0257 98.2 °F (36.8 °C) 81 30 118/76 93 %       No components found for: QTC      Labs:     COVID 19 related labs:  C-Reactive protein   Date Value Ref Range Status   07/24/2020 20.8 (H) 0.0 - 0.9 mg/dL Final   07/23/2020 19.9 (H) 0.0 - 0.9 mg/dL Final     LD   Date Value Ref Range Status   07/24/2020 358 (H) 110 - 210 U/L Final       CBC:      CBC WITH AUTOMATED DIFF    Collection Time: 07/24/20  7:45 AM   Result Value Ref Range    WBC 6.8 4.3 - 11.1 K/uL    RBC 2.20 (L) 4.23 - 5.6 M/uL    HGB 7.2 (L) 13.6 - 17.2 g/dL    HCT 21.2 (L) 41.1 - 50.3 %    MCV 96.4 79.6 - 97.8 FL    MCH 32.7 26.1 - 32.9 PG    MCHC 34.0 31.4 - 35.0 g/dL    RDW 18.9 (H) 11.9 - 14.6 %    PLATELET 511 434 - 749 K/uL    MPV 10.5 9.4 - 12.3 FL    ABSOLUTE NRBC 0.00 0.0 - 0.2 K/uL    DF AUTOMATED      NEUTROPHILS 94 (H) 43 - 78 %    LYMPHOCYTES 3 (L) 13 - 44 %    MONOCYTES 3 (L) 4.0 - 12.0 %    EOSINOPHILS 0 (L) 0.5 - 7.8 %    BASOPHILS 0 0.0 - 2.0 %    IMMATURE GRANULOCYTES 1 0.0 - 5.0 %    ABS. NEUTROPHILS 6.4 1.7 - 8.2 K/UL    ABS. LYMPHOCYTES 0.2 (L) 0.5 - 4.6 K/UL    ABS. MONOCYTES 0.2 0.1 - 1.3 K/UL    ABS. EOSINOPHILS 0.0 0.0 - 0.8 K/UL    ABS. BASOPHILS 0.0 0.0 - 0.2 K/UL    ABS. IMM. GRANS. 0.0 0.0 - 0.5 K/UL       CMP:      METABOLIC PANEL, COMPREHENSIVE    Collection Time: 07/24/20  7:45 AM   Result Value Ref Range    Sodium 142 136 - 145 mmol/L    Potassium 4.2 3.5 - 5.1 mmol/L    Chloride 109 (H) 98 - 107 mmol/L    CO2 23 21 - 32 mmol/L    Anion gap 10 7 - 16 mmol/L    Glucose 140 (H) 65 - 100 mg/dL    BUN 26 (H) 8 - 23 MG/DL    Creatinine 1.13 0.8 - 1.5 MG/DL    GFR est AA >60 >60 ml/min/1.73m2    GFR est non-AA >60 >60 ml/min/1.73m2    Calcium 8.7 8.3 - 10.4 MG/DL    Bilirubin, total 0.6 0.2 - 1.1 MG/DL    ALT (SGPT) 24 12 - 65 U/L    AST (SGOT) 29 15 - 37 U/L    Alk. phosphatase 149 (H) 50 - 136 U/L    Protein, total 6.4 6.3 - 8.2 g/dL    Albumin 2.8 (L) 3.2 - 4.6 g/dL    Globulin 3.6 (H) 2.3 - 3.5 g/dL    A-G Ratio 0.8 (L) 1.2 - 3.5         Microbiology:     All Micro Results     Procedure Component Value Units Date/Time    CULTURE, BLOOD [[id number]] Collected:  07/23/20 2052    Order Status:  Completed Specimen:  Blood Updated:  07/24/20 1317     Special Requests: --        NO SPECIAL REQUESTS  LATERAL PORT       Culture result: NO GROWTH AFTER 14 HOURS       CULTURE, BLOOD [[id number]]     Order Status:  Sent Specimen:  Blood           Recent Imaging:     CXR Results  (Last 48 hours)               07/1958  XR CHEST SNGL V Final result    Impression:  IMPRESSION:   1. Multifocal airspace consolidations with mid and lower lung predominance   highly suspicious for atypical multifocal pneumonia. 2. Diffuse pulmonary interstitial edema. 3. Right-sided chest port tip terminating in the cavoatrial junction. Narrative:  EXAM: Single view chest radiograph. INDICATION: Shortness of breath and chest pain, Covid +   COMPARISON: None. FINDINGS:   Right-sided chest port tip terminating in the cavoatrial junction. No   pneumothorax or pleural effusion. Diffuse pulmonary interstitial edema. Multifocal pulmonary consolidations with mid and lower lung predominance. Signed By: @XR@     July 24, 2020      Please note, requesting provider was notified via [employer] of the above documentation for his or her review. The patient's status was discussed with the patient's primary provider as well as the patient's primary nurse. ID did not physically enter the patient's room, nor did ID use a remote telehealth monitor, due to facility restrictions on the use of personal protective equipment and the need to preserve personal protective equipment at this time.     Time spent on the above: 15 minutes

## 2020-07-25 ENCOUNTER — APPOINTMENT (OUTPATIENT)
Dept: CT IMAGING | Age: 60
DRG: 177 | End: 2020-07-25
Attending: INTERNAL MEDICINE
Payer: COMMERCIAL

## 2020-07-25 LAB
ALBUMIN SERPL-MCNC: 2.9 G/DL (ref 3.2–4.6)
ALBUMIN/GLOB SERPL: 0.8 {RATIO} (ref 1.2–3.5)
ALP SERPL-CCNC: 140 U/L (ref 50–136)
ALT SERPL-CCNC: 23 U/L (ref 12–65)
ANION GAP SERPL CALC-SCNC: 8 MMOL/L (ref 7–16)
AST SERPL-CCNC: 19 U/L (ref 15–37)
BASOPHILS # BLD: 0 K/UL (ref 0–0.2)
BASOPHILS NFR BLD: 0 % (ref 0–2)
BILIRUB SERPL-MCNC: 0.5 MG/DL (ref 0.2–1.1)
BUN SERPL-MCNC: 30 MG/DL (ref 8–23)
CALCIUM SERPL-MCNC: 8.9 MG/DL (ref 8.3–10.4)
CHLORIDE SERPL-SCNC: 107 MMOL/L (ref 98–107)
CO2 SERPL-SCNC: 25 MMOL/L (ref 21–32)
CREAT SERPL-MCNC: 1.15 MG/DL (ref 0.8–1.5)
DIFFERENTIAL METHOD BLD: ABNORMAL
EOSINOPHIL # BLD: 0 K/UL (ref 0–0.8)
EOSINOPHIL NFR BLD: 0 % (ref 0.5–7.8)
ERYTHROCYTE [DISTWIDTH] IN BLOOD BY AUTOMATED COUNT: 18 % (ref 11.9–14.6)
GLOBULIN SER CALC-MCNC: 3.6 G/DL (ref 2.3–3.5)
GLUCOSE SERPL-MCNC: 122 MG/DL (ref 65–100)
HCT VFR BLD AUTO: 23.3 % (ref 41.1–50.3)
HGB BLD-MCNC: 7.9 G/DL (ref 13.6–17.2)
IMM GRANULOCYTES # BLD AUTO: 0 K/UL (ref 0–0.5)
IMM GRANULOCYTES NFR BLD AUTO: 1 % (ref 0–5)
LYMPHOCYTES # BLD: 0.4 K/UL (ref 0.5–4.6)
LYMPHOCYTES NFR BLD: 7 % (ref 13–44)
MCH RBC QN AUTO: 32.4 PG (ref 26.1–32.9)
MCHC RBC AUTO-ENTMCNC: 33.9 G/DL (ref 31.4–35)
MCV RBC AUTO: 95.5 FL (ref 79.6–97.8)
MM INDURATION POC: NORMAL (ref 0–5)
MONOCYTES # BLD: 0.3 K/UL (ref 0.1–1.3)
MONOCYTES NFR BLD: 4 % (ref 4–12)
NEUTS SEG # BLD: 5.4 K/UL (ref 1.7–8.2)
NEUTS SEG NFR BLD: 88 % (ref 43–78)
NRBC # BLD: 0 K/UL (ref 0–0.2)
PLATELET # BLD AUTO: 198 K/UL (ref 150–450)
PMV BLD AUTO: 10.3 FL (ref 9.4–12.3)
POTASSIUM SERPL-SCNC: 3.8 MMOL/L (ref 3.5–5.1)
PPD POC: NORMAL
PROT SERPL-MCNC: 6.5 G/DL (ref 6.3–8.2)
RBC # BLD AUTO: 2.44 M/UL (ref 4.23–5.6)
SODIUM SERPL-SCNC: 140 MMOL/L (ref 136–145)
WBC # BLD AUTO: 6.2 K/UL (ref 4.3–11.1)

## 2020-07-25 PROCEDURE — 74011250636 HC RX REV CODE- 250/636: Performed by: INTERNAL MEDICINE

## 2020-07-25 PROCEDURE — 74011250636 HC RX REV CODE- 250/636: Performed by: HOSPITALIST

## 2020-07-25 PROCEDURE — 74011250637 HC RX REV CODE- 250/637: Performed by: HOSPITALIST

## 2020-07-25 PROCEDURE — 71260 CT THORAX DX C+: CPT

## 2020-07-25 PROCEDURE — 65270000029 HC RM PRIVATE

## 2020-07-25 PROCEDURE — 74011250637 HC RX REV CODE- 250/637: Performed by: INTERNAL MEDICINE

## 2020-07-25 PROCEDURE — 99223 1ST HOSP IP/OBS HIGH 75: CPT | Performed by: INTERNAL MEDICINE

## 2020-07-25 PROCEDURE — 74011000258 HC RX REV CODE- 258: Performed by: INTERNAL MEDICINE

## 2020-07-25 PROCEDURE — 80053 COMPREHEN METABOLIC PANEL: CPT

## 2020-07-25 PROCEDURE — 74011636320 HC RX REV CODE- 636/320: Performed by: INTERNAL MEDICINE

## 2020-07-25 PROCEDURE — 85025 COMPLETE CBC W/AUTO DIFF WBC: CPT

## 2020-07-25 PROCEDURE — 94761 N-INVAS EAR/PLS OXIMETRY MLT: CPT

## 2020-07-25 PROCEDURE — 77010033711 HC HIGH FLOW OXYGEN

## 2020-07-25 RX ORDER — UREA 10 %
220 LOTION (ML) TOPICAL 2 TIMES DAILY
Status: DISCONTINUED | OUTPATIENT
Start: 2020-07-25 | End: 2020-07-30 | Stop reason: HOSPADM

## 2020-07-25 RX ORDER — GUAIFENESIN 600 MG/1
1200 TABLET, EXTENDED RELEASE ORAL EVERY 12 HOURS
Status: DISCONTINUED | OUTPATIENT
Start: 2020-07-25 | End: 2020-07-30 | Stop reason: HOSPADM

## 2020-07-25 RX ORDER — MELATONIN
1000 DAILY
Status: DISCONTINUED | OUTPATIENT
Start: 2020-07-26 | End: 2020-07-30 | Stop reason: HOSPADM

## 2020-07-25 RX ORDER — SODIUM CHLORIDE 0.9 % (FLUSH) 0.9 %
5-10 SYRINGE (ML) INJECTION
Status: COMPLETED | OUTPATIENT
Start: 2020-07-25 | End: 2020-07-25

## 2020-07-25 RX ADMIN — ENOXAPARIN SODIUM 40 MG: 40 INJECTION SUBCUTANEOUS at 08:51

## 2020-07-25 RX ADMIN — CEFTRIAXONE SODIUM 2 G: 2 INJECTION, POWDER, FOR SOLUTION INTRAMUSCULAR; INTRAVENOUS at 21:04

## 2020-07-25 RX ADMIN — Medication 10 ML: at 10:46

## 2020-07-25 RX ADMIN — TRAMADOL HYDROCHLORIDE 50 MG: 50 TABLET ORAL at 20:19

## 2020-07-25 RX ADMIN — Medication 10 ML: at 13:35

## 2020-07-25 RX ADMIN — LORAZEPAM 0.5 MG: 0.5 TABLET ORAL at 21:04

## 2020-07-25 RX ADMIN — DEXAMETHASONE SODIUM PHOSPHATE 6 MG: 10 INJECTION INTRAMUSCULAR; INTRAVENOUS at 08:58

## 2020-07-25 RX ADMIN — FOLIC ACID 1 MG: 1 TABLET ORAL at 08:52

## 2020-07-25 RX ADMIN — SODIUM CHLORIDE 100 ML: 900 INJECTION, SOLUTION INTRAVENOUS at 10:46

## 2020-07-25 RX ADMIN — FUROSEMIDE 40 MG: 10 INJECTION, SOLUTION INTRAMUSCULAR; INTRAVENOUS at 17:40

## 2020-07-25 RX ADMIN — GUAIFENESIN 1200 MG: 600 TABLET ORAL at 13:35

## 2020-07-25 RX ADMIN — Medication 10 ML: at 21:04

## 2020-07-25 RX ADMIN — GUAIFENESIN 1200 MG: 600 TABLET ORAL at 20:19

## 2020-07-25 RX ADMIN — Medication 500 MG: at 09:12

## 2020-07-25 RX ADMIN — FLUTICASONE PROPIONATE 2 SPRAY: 50 SPRAY, METERED NASAL at 09:03

## 2020-07-25 RX ADMIN — MULTIPLE VITAMINS W/ MINERALS TAB 1 TABLET: TAB at 08:52

## 2020-07-25 RX ADMIN — Medication 220 MG: at 08:52

## 2020-07-25 RX ADMIN — LORATADINE 10 MG: 10 TABLET ORAL at 08:52

## 2020-07-25 RX ADMIN — IOPAMIDOL 100 ML: 755 INJECTION, SOLUTION INTRAVENOUS at 10:46

## 2020-07-25 RX ADMIN — DOXYCYCLINE 100 MG: 100 INJECTION, POWDER, LYOPHILIZED, FOR SOLUTION INTRAVENOUS at 08:59

## 2020-07-25 RX ADMIN — BUDESONIDE 9 MG: 3 CAPSULE, GELATIN COATED ORAL at 09:12

## 2020-07-25 RX ADMIN — PANTOPRAZOLE SODIUM 40 MG: 40 TABLET, DELAYED RELEASE ORAL at 05:47

## 2020-07-25 RX ADMIN — Medication 10 ML: at 05:47

## 2020-07-25 RX ADMIN — FUROSEMIDE 40 MG: 10 INJECTION, SOLUTION INTRAMUSCULAR; INTRAVENOUS at 08:53

## 2020-07-25 RX ADMIN — ACETAMINOPHEN 650 MG: 325 TABLET, FILM COATED ORAL at 08:52

## 2020-07-25 RX ADMIN — DOXYCYCLINE 100 MG: 100 INJECTION, POWDER, LYOPHILIZED, FOR SOLUTION INTRAVENOUS at 20:19

## 2020-07-25 RX ADMIN — TRAMADOL HYDROCHLORIDE 50 MG: 50 TABLET ORAL at 12:14

## 2020-07-25 RX ADMIN — Medication 220 MG: at 17:42

## 2020-07-25 NOTE — PROGRESS NOTES
PAMELLA received from Norris Chen RN. Upon entering the room for initial assessment of the patient, it was noted by Melvina Castro CNA that patient's oxygen saturation had decreased to 78% at 7 Lpm via nasal cannula at 0727. We increased his oxygen flow to 15 Lpm via nasal cannula. Respiratory was notified.

## 2020-07-25 NOTE — PROGRESS NOTES
07/25/20 0742   Oxygen Therapy   O2 Sat (%) 91 %   Pulse via Oximetry 78 beats per minute   O2 Device Hi flow nasal cannula   O2 Flow Rate (L/min) 15 l/min

## 2020-07-25 NOTE — PROGRESS NOTES
Hospitalist Progress Note    2020  Admit Date: 2020  7:37 PM   NAME: Jimmy Singh   :  1960   MRN:  [id number]   Attending: Liang Peraza DO  PCP:  Sierra Gar MD    SUBJECTIVE:   Patient is a 55 y/o male with hx MDS, multiple myeloma, anemia, HLD presents to ED with worsening shortness of breath, hypoxia (RA 85%), tachypnea, worsening severe headache, loss of taste and smell. He was tested for Covid-19 on  and was positive. His initial symptoms actually started at least 2 weeks ago. Thinks he got it from his wife who works at [facility]. He received a blood transfusion for anemia yesterday. In ED he had tachypnea and diffuse rales. His chest xray shows diffuse infiltrates. Was started on IV decadron as well as rocephin/azithromycin. Hospitalist service consulted for admission. Interval History (): patient examined at bedside. No acute events overnight. Had \"a real bad coughing spell this morning\" and had worsening hypoxemia, increased O2 requirements from 7L to 15L. Still having shortness of breath with productive cough. Says \"when I get that stuff out of my lungs I think it helps. \" No fevers/chills. No chest pain \"except when I cough. \" No abdominal pain.      Review of Systems negative with exception of pertinent positives noted above  PHYSICAL EXAM     Visit Vitals  /63 (BP 1 Location: Right arm, BP Patient Position: At rest)   Pulse 80   Temp 98.1 °F (36.7 °C)   Resp 18   Ht 6' (1.829 m)   Wt 105.4 kg (232 lb 5.8 oz)   SpO2 92%   BMI 31.51 kg/m²      Temp (24hrs), Av.2 °F (36.8 °C), Min:98 °F (36.7 °C), Max:98.6 °F (37 °C)    Oxygen Therapy  O2 Sat (%): 92 % (20 1119)  Pulse via Oximetry: 83 beats per minute (20 1002)  O2 Device: Hi flow nasal cannula (20 1002)  O2 Flow Rate (L/min): 5 l/min (20 1002)    Intake/Output Summary (Last 24 hours) at 2020 1151  Last data filed at 2020 1012  Gross per 24 hour   Intake 920 ml   Output 2100 ml   Net -1180 ml      General: No acute distress    Lungs:  Diffuse crackles on anterior auscultation, dyspneic. Productive cough. Heart:  Regular rate and rhythm,  No murmur, rub, or gallop  Abdomen: Soft, Non distended, Non tender, Positive bowel sounds  Extremities: No cyanosis, clubbing or edema  Neurologic:  No focal deficits    ASSESSMENT      Active Hospital Problems    Diagnosis Date Noted    Acute respiratory failure with hypoxia ([address] 07/23/2020    COVID-19 virus infection 07/23/2020    Headache 07/23/2020    Pulmonary infiltrates on CXR 07/23/2020    Obesity 11/29/2017    Multiple myeloma ([facility] 07/01/2016    MDS (myelodysplastic syndrome) ([facility]      Plan:    # Acute hypoxic respiratory failure, multifactorial  - positive for COVID 19  - started on Decadron 6 mg IV (day 2)  - consent signed for convalescent plasma, ordered and pending  - consult ID to see if candidate for remdesevir  - received Rocephin/azithromycin in ED for CAP coverage  - continue Rocephin  - switch azithromycin to doxycycline due to prolonged QT interval on EKG  - diffuse pulmonary edema on imaging, continue Lasix 40 mg IV BID  - CT chest with contrast negative for PE  - patient also has received several blood transfusions this past week  - ordered TTE, patient with prior echocardiogram in 2016 showing Grade I diastolic dysfunction  - strict I's/O's  - wean supplemental oxygen as tolerated    # History of MDS/myeloma  - gets chronic blood transfusions  - no obvious signs of bleeding  - trend Hgb/Hct  - transfuse for Hgb<7  - has received several blood transfusions recently    F/E/N: no fluids, replete electrolytes as needed, regular diet    Ppx: Lovenox for VTE    Code Status: FULL CODE    Disposition: pending clinical improvement with plan as above. Discussed with patient at bedside. All questions answered.      Signed By: Maura Barnes DO     July 25, 2020

## 2020-07-25 NOTE — PROGRESS NOTES
Patient reports continued severe headache, rating pain as 7 out of 10 and constant. PRN Tramadol administered. See MAR. Will continue to monitor.

## 2020-07-25 NOTE — PROGRESS NOTES
End of Shift Note    Patient stable, lying in bed resting, in no apparent distress. Oxygen saturation has decreased with exertion throughout the day, but is currently saturating 92%. Bed wheels and chair wheels are locked, call light is within reach. Preparing to give report to oncoming nurse.

## 2020-07-25 NOTE — PROGRESS NOTES
Patient transported to CT in his bed on 7 Lpm oxygen. CT made aware that patient exhibits dyspnea upon exertion.

## 2020-07-25 NOTE — PROGRESS NOTES
Telemetry notified the floor of artifact on patient's telemetry monitor. Patient often repositioning between supine and prone position for comfort.  Prone position may be appearing as artifact on tele

## 2020-07-25 NOTE — PROGRESS NOTES
Problem: Falls - Risk of  Goal: *Absence of Falls  Description: Document Eliot Austyn Fall Risk and appropriate interventions in the flowsheet. Outcome: Progressing Towards Goal  Note: Fall Risk Interventions:            Medication Interventions: Evaluate medications/consider consulting pharmacy, Teach patient to arise slowly                   Problem: Patient Education: Go to Patient Education Activity  Goal: Patient/Family Education  Outcome: Progressing Towards Goal     Problem: Discharge Planning  Goal: *Discharge to safe environment  Outcome: Progressing Towards Goal  Goal: *Knowledge of medication management  Outcome: Progressing Towards Goal  Goal: *Knowledge of discharge instructions  Outcome: Progressing Towards Goal     Problem: Patient Education: Go to Patient Education Activity  Goal: Patient/Family Education  Outcome: Progressing Towards Goal     Problem: Activity Intolerance  Goal: *Oxygen saturation during activity within specified parameters  Outcome: Progressing Towards Goal  Goal: *Able to remain out of bed as prescribed  Outcome: Progressing Towards Goal     Problem: Patient Education: Go to Patient Education Activity  Goal: Patient/Family Education  Outcome: Progressing Towards Goal     Problem: Airway Clearance - Ineffective  Goal: Achieve or maintain patent airway  Outcome: Progressing Towards Goal     Problem: Gas Exchange - Impaired  Goal: Absence of hypoxia  Outcome: Progressing Towards Goal  Goal: Promote optimal lung function  Outcome: Progressing Towards Goal     Problem: Breathing Pattern - Ineffective  Goal: Ability to achieve and maintain a regular respiratory rate  Outcome: Progressing Towards Goal     Problem:  Body Temperature -  Risk of, Imbalanced  Goal: Ability to maintain a body temperature within defined limits  Outcome: Progressing Towards Goal  Goal: Will regain or maintain usual level of consciousness  Outcome: Progressing Towards Goal  Goal: Complications related to the disease process, condition or treatment will be avoided or minimized  Outcome: Progressing Towards Goal     Problem: Isolation Precautions - Risk of Spread of Infection  Goal: Prevent transmission of infectious organism to others  Outcome: Progressing Towards Goal     Problem: Nutrition Deficits  Goal: Optimize nutrtional status  Outcome: Progressing Towards Goal     Problem: Risk for Fluid Volume Deficit  Goal: Maintain normal heart rhythm  Outcome: Progressing Towards Goal  Goal: Maintain absence of muscle cramping  Outcome: Progressing Towards Goal  Goal: Maintain normal serum potassium, sodium, calcium, phosphorus, and pH  Outcome: Progressing Towards Goal     Problem: Loneliness or Risk for Loneliness  Goal: Demonstrate positive use of time alone when socialization is not possible  Outcome: Progressing Towards Goal     Problem: Fatigue  Goal: Verbalize increase energy and improved vitality  Outcome: Progressing Towards Goal     Problem: Patient Education: Go to Patient Education Activity  Goal: Patient/Family Education  Outcome: Progressing Towards Goal     Problem: Infection - Risk of, Central Venous Catheter-Associated Bloodstream Infection  Goal: *Absence of infection signs and symptoms  Outcome: Progressing Towards Goal     Problem: Patient Education: Go to Patient Education Activity  Goal: Patient/Family Education  Outcome: Progressing Towards Goal       Brigitte Specking

## 2020-07-25 NOTE — PROGRESS NOTES
Patient returned from CAT scan. Oxygen saturation 80% on 7 Lpm. Justina Palomo RN stayed with patient until oxygen saturation returned to 93%.

## 2020-07-25 NOTE — CONSULTS
CONSULT NOTE    Lenajuan c Hannah    7/25/2020    Date of Admission:  7/23/2020    The patient's chart is reviewed and the patient is discussed with the staff. Subjective:     Patient is a 56 y.o.  male seen and evaluated at the request of Dr. Priya Butler    Patient has MM/MDS/GI issues and ?UC on chronic immunosuppression came in today since has been having dyspnea, hypoxemia, HA and was COVID 19 + on 7/16. Since here oxygen needs up and CXR with b/l infiltrates. oxygen went up from 7 L to 15 L and asked to see patient. CT done today and noted b/l GGO with no PE. oxygenation now back down to 5-7 L. Coughing up secretions. Sore but able to self prone. Only smoked a few years when in high school but does not use any inhalers or sprays at home.        Review of Systems:  -Fever  --Headaches  -Chest pain  +Dyspnea, +wheezing, +cough  -Abdominal pain, -constipation  -Leg swelling  All other organ systems grossly normal.    Patient Active Problem List   Diagnosis Code    Allergic rhinitis J30.9    Constipation K59.00    Depressive disorder F32.9    Gastritis and gastroduodenitis K29.70, K29.90    Hyperglycemia R73.9    Hyperlipidemia E78.5    Pain in joint involving lower leg M25.569    Plantar fasciitis M72.2    Splenomegaly R16.1    MDS (myelodysplastic syndrome) ([facility]) D46.9    Encounter for long-term (current) drug use Z79.899    Multiple myeloma ([facility]) C90.00    Sinusitis J32.9    AK (actinic keratosis) L57.0    Peripheral neuropathy due to chemotherapy ([facility]) G62.0, T45.1X5A    Chronic fatigue R53.82    Obesity E66.9    Shoulder pain M25.519    Radial nerve dysfunction, right G56.31    Lower urinary tract symptoms R39.9    Diastasis recti M62.08    History of colonic diverticulitis Z87.19    Anemia D64.9    Acute colitis K52.9    Acute respiratory failure with hypoxia ([facility]) J96.01    COVID-19 virus infection U07.1    Headache R51    Pulmonary infiltrates on CXR R91.8          Prior to Admission Medications   Prescriptions Last Dose Informant Patient Reported? Taking? BUDESONIDE PO 2020 at Unknown time  Yes Yes   Sig: Take 9 mg by mouth daily. FOLIC ACID/MULTIVIT-MIN/LUTEIN (CENTRUM SILVER PO) 2020 at Unknown time  Yes Yes   Sig: Take 1 Tab by mouth daily. LORazepam (ATIVAN) 0.5 mg tablet 2020 at Unknown time  Yes Yes   Sig: Take 0.5 mg by mouth nightly. azelastine (ASTELIN) 137 mcg (0.1 %) nasal spray 2020 at Unknown time  No Yes   Sig: USE 2 SPRAYS IN EACH NOSTRIL TWICE DAILY   cetirizine (ZYRTEC) 10 mg tablet 2020 at Unknown time  Yes Yes   Sig: Take 10 mg by mouth nightly. colestipoL (COLESTID) 1 gram tablet 2020 at Unknown time  Yes Yes   Sig: Take 2 g by mouth two (2) times a day. darbepoetin steven in polysorbat (ARANESP, POLYSORBATE,) 200 mcg/mL Injection 2020 at Unknown time  No Yes   Si mL by SubCUTAneous route Once every 2 weeks. fluticasone propionate (FLONASE) 50 mcg/actuation nasal spray 2020 at Unknown time  No Yes   Sig: SHAKE LIQUID AND USE 2 SPRAYS IN EACH NOSTRIL EVERY NIGHT   folic acid (FOLVITE) 1 mg tablet 2020 at Unknown time  Yes Yes   Sig: Take 1 mg by mouth. 1 QD   ibuprofen (MOTRIN) 200 mg tablet 2020 at Unknown time  Yes Yes   Sig: Take 200 mg by mouth three (3) times daily as needed for Pain. naproxen sodium (ALEVE) 220 mg tablet 2020 at Unknown time  Yes Yes   Sig: Take 440 mg by mouth two (2) times daily as needed. omeprazole (PRILOSEC) 40 mg capsule 2020 at Unknown time  Yes Yes   Sig: Take 40 mg by mouth daily.       Facility-Administered Medications: None       Past Medical History:   Diagnosis Date    Allergic rhinitis 2015    Anemia     Constipation 2015    Depressive disorder 2015    Gastritis and gastroduodenitis 2015    Hyperglycemia 2015    Hyperlipidemia 2015    MDS (myelodysplastic syndrome) ([facility])     Pain in joint involving lower leg 11/4/2015    Plantar fasciitis 11/4/2015    Splenomegaly 11/4/2015     Past Surgical History:   Procedure Laterality Date    HX ORTHOPAEDIC Right     fx elbow/ \"replaced radial head\"    HX VASECTOMY       Social History     Socioeconomic History    Marital status:      Spouse name: Not on file    Number of children: Not on file    Years of education: Not on file    Highest education level: Not on file   Occupational History    Not on file   Social Needs    Financial resource strain: Not on file    Food insecurity     Worry: Not on file     Inability: Not on file    Transportation needs     Medical: Not on file     Non-medical: Not on file   Tobacco Use    Smoking status: Never Smoker    Smokeless tobacco: Never Used   Substance and Sexual Activity    Alcohol use: No    Drug use: No    Sexual activity: Not on file   Lifestyle    Physical activity     Days per week: Not on file     Minutes per session: Not on file    Stress: Not on file   Relationships    Social connections     Talks on phone: Not on file     Gets together: Not on file     Attends [employer] service: Not on file     Active member of club or organization: Not on file     Attends meetings of clubs or organizations: Not on file     Relationship status: Not on file    Intimate partner violence     Fear of current or ex partner: Not on file     Emotionally abused: Not on file     Physically abused: Not on file     Forced sexual activity: Not on file   Other Topics Concern     Service Not Asked    Blood Transfusions Not Asked    Caffeine Concern Not Asked    Occupational Exposure Not Asked   [employer] Not Asked    Sleep Concern Not Asked    Stress Concern Not Asked    Weight Concern Not Asked    Special Diet Not Asked    Back Care Not Asked    Exercise Not Asked    Bike Helmet Not Asked   [address] Not Asked    Self-Exams Not Asked   Social History Narrative    Not on file Family History   Problem Relation Age of Onset    Cancer Father     Tuberculosis Other      No Known Allergies    Current Facility-Administered Medications   Medication Dose Route Frequency    cefTRIAXone (ROCEPHIN) 2 g in 0.9% sodium chloride (MBP/ADV) 50 mL  2 g IntraVENous Q24H    doxycycline (VIBRAMYCIN) 100 mg in 0.9% sodium chloride (MBP/ADV) 100 mL  100 mg IntraVENous Q12H    furosemide (LASIX) injection 40 mg  40 mg IntraVENous BID    enoxaparin (LOVENOX) injection 40 mg  40 mg SubCUTAneous Q24H    0.9% sodium chloride infusion 250 mL  250 mL IntraVENous PRN    budesonide (ENTOCORT EC) capsule 9 mg  9 mg Oral DAILY    loratadine (CLARITIN) tablet 10 mg  10 mg Oral DAILY    fluticasone propionate (FLONASE) 50 mcg/actuation nasal spray 2 Spray  2 Spray Both Nostrils DAILY    folic acid (FOLVITE) tablet 1 mg  1 mg Oral DAILY    LORazepam (ATIVAN) tablet 0.5 mg  0.5 mg Oral QHS    pantoprazole (PROTONIX) tablet 40 mg  40 mg Oral ACB    multivitamin, tx-iron-ca-min (THERA-M w/ IRON) tablet 1 Tab  1 Tab Oral DAILY    colestipoL (COLESTID) tablet 1 g (Patient Supplied)  1 g Oral BID    zinc sulfate tablet 220 mg  220 mg Oral DAILY    ascorbic acid (vitamin C) (VITAMIN C) tablet 500 mg  500 mg Oral DAILY    dexamethasone (DECADRON) 10 mg/mL injection 6 mg  6 mg IntraVENous DAILY    albuterol (PROVENTIL VENTOLIN) nebulizer solution 2.5 mg  2.5 mg Nebulization Q4H PRN    traMADoL (ULTRAM) tablet 50 mg  50 mg Oral Q6H PRN    sodium chloride (NS) flush 5-40 mL  5-40 mL IntraVENous Q8H    sodium chloride (NS) flush 5-40 mL  5-40 mL IntraVENous PRN    acetaminophen (TYLENOL) tablet 650 mg  650 mg Oral Q4H PRN    ondansetron (ZOFRAN ODT) tablet 4 mg  4 mg Oral Q4H PRN    diphenhydrAMINE (BENADRYL) capsule 25 mg  25 mg Oral Q6H PRN    bisacodyL (DULCOLAX) tablet 5 mg  5 mg Oral DAILY PRN         Objective:     Vitals:    07/25/20 0730 07/25/20 0742 07/25/20 1002 07/25/20 1119   BP: 133/70 114/63   Pulse: 73   80   Resp: 21   18   Temp: 98.1 °F (36.7 °C)   98.1 °F (36.7 °C)   SpO2: (!) 78% 91% 90% 92%   Weight:       Height:           PHYSICAL EXAM     Constitutional:  the patient is well developed and in no acute distress  EENMT:  Sclera clear, pupils equal, oral mucosa moist  Respiratory: b/l coarse sounds with crackles  Cardiovascular:  RRR without M,G,R  Gastrointestinal: soft and non-tender; with positive bowel sounds. Musculoskeletal: warm without cyanosis. There is no lower extremity edema. Skin:  no jaundice or rashes no wounds   Neurologic: no gross neuro deficits     Psychiatric:  alert and oriented x 3    CXR:    CT chest:  Diffuse GGO b/l. No PE        Recent Labs     07/25/20  0822 07/24/20  1428 07/24/20  0745 07/23/20  1842   WBC 6.2  --  6.8 9.9   HGB 7.9* 8.2* 7.2* 8.1*   HCT 23.3* 23.8* 21.2* 23.0*     --  175 184     Recent Labs     07/25/20  0822 07/24/20  0745 07/23/20  1842    142 140   K 3.8 4.2 3.8    109* 107   * 140* 133*   CO2 25 23 24   BUN 30* 26* 29*   CREA 1.15 1.13 1.50   MG  --  2.1  --    CA 8.9 8.7 8.9   ALB 2.9* 2.8* 3.0*   TBILI 0.5 0.6 1.0   ALT 23 24 25     No results for input(s): PH, PCO2, PO2, HCO3, PHI, PCO2I, PO2I, HCO3I in the last 72 hours. No results for input(s): LCAD, LAC in the last 72 hours.     Assessment:  (Medical Decision Making)     Hospital Problems  Date Reviewed: 7/20/2020          Codes Class Noted POA    * (Principal) Acute respiratory failure [facility] ICD-10-CM: J96.01  ICD-9-CM: 518.81  7/23/2020 Yes        COVID-19 virus infection ICD-10-CM: U07.1  ICD-9-CM: 079.89  7/23/2020 Yes        Headache ICD-10-CM: R51  ICD-9-CM: 784.0  7/23/2020 Yes        Pulmonary infiltrates on CXR ICD-10-CM: R91.8  ICD-9-CM: 793.19  7/23/2020 Yes        Obesity ICD-10-CM: E66.9  ICD-9-CM: 278.00  11/29/2017 Yes        Multiple myeloma ([facility] ICD-10-CM: C90.00  ICD-9-CM: 203.00  7/1/2016 Yes        MDS [facility]elodysplastic syndrome) [facility] ICD-10-CM: D46.9  ICD-9-CM: 238.75  Unknown Yes            Patient with MM/MDS/IBS with UC on immunosuppressants with COVID-19 PNA and moderate oxygen needs at 5 LPM.   Plan:  (Medical Decision Making)     --taper oxygen as tolerated  --add mucinex and flutter valve  --albuterol HFA  --For Covid agree with dex 6 mg daily for 10 days, s/p convalescent plasma on 7/24, NO remdesivir per ID, Add vitamines  --self proning and able to do so  --can continue abx  --continue remaining treatment. Can stay on floor for now. If worse then will need ICU  Will follow remotely and not see in person unless getting worse -- to decrease risk of Covid exposure. More than 50% of the time documented was spent in face-to-face contact with the patient and in the care of the patient on the floor/unit where the patient is located. Thank you very much for this referral.  We appreciate the opportunity to participate in this patient's care. Will follow along with above stated plan.     Mojgan Rich MD

## 2020-07-25 NOTE — PROGRESS NOTES
Problem: Falls - Risk of  Goal: *Absence of Falls  Description: Document [facility] Fall Risk and appropriate interventions in the flowsheet. Outcome: Progressing Towards Goal  Note: Fall Risk Interventions:            Medication Interventions: Evaluate medications/consider consulting pharmacy, Patient to call before getting OOB, Teach patient to arise slowly                   Problem: Airway Clearance - Ineffective  Goal: Achieve or maintain patent airway  Outcome: Progressing Towards Goal     Problem: Gas Exchange - Impaired  Goal: Absence of hypoxia  Outcome: Progressing Towards Goal     Problem: Gas Exchange - Impaired  Goal: Promote optimal lung function  Outcome: Progressing Towards Goal     Problem: Gas Exchange - Impaired  Goal: Promote optimal lung function  Outcome: Progressing Towards Goal     Problem: Breathing Pattern - Ineffective  Goal: Ability to achieve and maintain a regular respiratory rate  Outcome: Progressing Towards Goal     Problem: Body Temperature -  Risk of, Imbalanced  Goal: Ability to maintain a body temperature within defined limits  Outcome: Progressing Towards Goal     Problem:  Body Temperature -  Risk of, Imbalanced  Goal: Will regain or maintain usual level of consciousness  Outcome: Progressing Towards Goal     Problem: Isolation Precautions - Risk of Spread of Infection  Goal: Prevent transmission of infectious organism to others  Outcome: Progressing Towards Goal     Problem: Isolation Precautions - Risk of Spread of Infection  Goal: Prevent transmission of infectious organism to others  Outcome: Progressing Towards Goal     Problem: Nutrition Deficits  Goal: Optimize nutrtional status  Outcome: Progressing Towards Goal     Problem: Risk for Fluid Volume Deficit  Goal: Maintain normal heart rhythm  Outcome: Progressing Towards Goal     Problem: Risk for Fluid Volume Deficit  Goal: Maintain absence of muscle cramping  Outcome: Progressing Towards Goal     Problem: Risk for Fluid Volume Deficit  Goal: Maintain normal serum potassium, sodium, calcium, phosphorus, and pH  Outcome: Progressing Towards Goal     Problem: Loneliness or Risk for Loneliness  Goal: Demonstrate positive use of time alone when socialization is not possible  Outcome: Progressing Towards Goal     Problem: Fatigue  Goal: Verbalize increase energy and improved vitality  Outcome: Progressing Towards Goal     Problem: Infection - Risk of, Central Venous Catheter-Associated Bloodstream Infection  Goal: *Absence of infection signs and symptoms  Outcome: Progressing Towards Goal     Problem:  Activity Intolerance  Goal: *Oxygen saturation during activity within specified parameters  Outcome: Not Progressing Towards Goal  Goal: *Able to remain out of bed as prescribed  Outcome: Not Progressing Towards Goal

## 2020-07-26 LAB
ALBUMIN SERPL-MCNC: 3.1 G/DL (ref 3.2–4.6)
ALBUMIN/GLOB SERPL: 0.8 {RATIO} (ref 1.2–3.5)
ALP SERPL-CCNC: 136 U/L (ref 50–136)
ALT SERPL-CCNC: 23 U/L (ref 12–65)
ANION GAP SERPL CALC-SCNC: 9 MMOL/L (ref 7–16)
AST SERPL-CCNC: 17 U/L (ref 15–37)
BASOPHILS # BLD: 0 K/UL (ref 0–0.2)
BASOPHILS NFR BLD: 0 % (ref 0–2)
BILIRUB DIRECT SERPL-MCNC: 0.2 MG/DL
BILIRUB SERPL-MCNC: 0.5 MG/DL (ref 0.2–1.1)
BUN SERPL-MCNC: 32 MG/DL (ref 8–23)
CALCIUM SERPL-MCNC: 9.2 MG/DL (ref 8.3–10.4)
CHLORIDE SERPL-SCNC: 107 MMOL/L (ref 98–107)
CO2 SERPL-SCNC: 26 MMOL/L (ref 21–32)
CREAT SERPL-MCNC: 1.31 MG/DL (ref 0.8–1.5)
DIFFERENTIAL METHOD BLD: ABNORMAL
EOSINOPHIL # BLD: 0.1 K/UL (ref 0–0.8)
EOSINOPHIL NFR BLD: 1 % (ref 0.5–7.8)
ERYTHROCYTE [DISTWIDTH] IN BLOOD BY AUTOMATED COUNT: 17.2 % (ref 11.9–14.6)
GLOBULIN SER CALC-MCNC: 3.7 G/DL (ref 2.3–3.5)
GLUCOSE SERPL-MCNC: 102 MG/DL (ref 65–100)
HCT VFR BLD AUTO: 25.9 % (ref 41.1–50.3)
HGB BLD-MCNC: 8.9 G/DL (ref 13.6–17.2)
IMM GRANULOCYTES # BLD AUTO: 0.1 K/UL (ref 0–0.5)
IMM GRANULOCYTES NFR BLD AUTO: 1 % (ref 0–5)
LYMPHOCYTES # BLD: 0.7 K/UL (ref 0.5–4.6)
LYMPHOCYTES NFR BLD: 8 % (ref 13–44)
MAGNESIUM SERPL-MCNC: 1.9 MG/DL (ref 1.8–2.4)
MCH RBC QN AUTO: 33.2 PG (ref 26.1–32.9)
MCHC RBC AUTO-ENTMCNC: 34.4 G/DL (ref 31.4–35)
MCV RBC AUTO: 96.6 FL (ref 79.6–97.8)
MM INDURATION POC: 0 MM (ref 0–5)
MONOCYTES # BLD: 0.6 K/UL (ref 0.1–1.3)
MONOCYTES NFR BLD: 6 % (ref 4–12)
NEUTS SEG # BLD: 7.7 K/UL (ref 1.7–8.2)
NEUTS SEG NFR BLD: 85 % (ref 43–78)
NRBC # BLD: 0 K/UL (ref 0–0.2)
PLATELET # BLD AUTO: 245 K/UL (ref 150–450)
PMV BLD AUTO: 10.4 FL (ref 9.4–12.3)
POTASSIUM SERPL-SCNC: 3.6 MMOL/L (ref 3.5–5.1)
PPD POC: NEGATIVE NEGATIVE
PROT SERPL-MCNC: 6.8 G/DL (ref 6.3–8.2)
RBC # BLD AUTO: 2.68 M/UL (ref 4.23–5.6)
SODIUM SERPL-SCNC: 142 MMOL/L (ref 136–145)
WBC # BLD AUTO: 9.2 K/UL (ref 4.3–11.1)

## 2020-07-26 PROCEDURE — 83735 ASSAY OF MAGNESIUM: CPT

## 2020-07-26 PROCEDURE — 74011250636 HC RX REV CODE- 250/636: Performed by: HOSPITALIST

## 2020-07-26 PROCEDURE — 74011250636 HC RX REV CODE- 250/636: Performed by: INTERNAL MEDICINE

## 2020-07-26 PROCEDURE — 74011250637 HC RX REV CODE- 250/637: Performed by: INTERNAL MEDICINE

## 2020-07-26 PROCEDURE — 74011000258 HC RX REV CODE- 258: Performed by: INTERNAL MEDICINE

## 2020-07-26 PROCEDURE — 65270000029 HC RM PRIVATE

## 2020-07-26 PROCEDURE — 30233L1 TRANSFUSION OF NONAUTOLOGOUS FRESH PLASMA INTO PERIPHERAL VEIN, PERCUTANEOUS APPROACH: ICD-10-PCS | Performed by: INTERNAL MEDICINE

## 2020-07-26 PROCEDURE — 80048 BASIC METABOLIC PNL TOTAL CA: CPT

## 2020-07-26 PROCEDURE — 85025 COMPLETE CBC W/AUTO DIFF WBC: CPT

## 2020-07-26 PROCEDURE — 36430 TRANSFUSION BLD/BLD COMPNT: CPT

## 2020-07-26 PROCEDURE — 74011250637 HC RX REV CODE- 250/637: Performed by: HOSPITALIST

## 2020-07-26 PROCEDURE — 80076 HEPATIC FUNCTION PANEL: CPT

## 2020-07-26 RX ORDER — SODIUM CHLORIDE 9 MG/ML
50 INJECTION, SOLUTION INTRAVENOUS CONTINUOUS
Status: DISCONTINUED | OUTPATIENT
Start: 2020-07-26 | End: 2020-07-28

## 2020-07-26 RX ADMIN — Medication 500 MG: at 08:32

## 2020-07-26 RX ADMIN — ENOXAPARIN SODIUM 40 MG: 40 INJECTION SUBCUTANEOUS at 08:32

## 2020-07-26 RX ADMIN — Medication 10 ML: at 21:51

## 2020-07-26 RX ADMIN — PANTOPRAZOLE SODIUM 40 MG: 40 TABLET, DELAYED RELEASE ORAL at 05:56

## 2020-07-26 RX ADMIN — DEXAMETHASONE SODIUM PHOSPHATE 6 MG: 10 INJECTION INTRAMUSCULAR; INTRAVENOUS at 08:29

## 2020-07-26 RX ADMIN — TRAMADOL HYDROCHLORIDE 50 MG: 50 TABLET ORAL at 12:03

## 2020-07-26 RX ADMIN — Medication 10 ML: at 08:35

## 2020-07-26 RX ADMIN — DOXYCYCLINE 100 MG: 100 INJECTION, POWDER, LYOPHILIZED, FOR SOLUTION INTRAVENOUS at 08:33

## 2020-07-26 RX ADMIN — MELATONIN 1 TABLET: at 10:28

## 2020-07-26 RX ADMIN — GUAIFENESIN 1200 MG: 600 TABLET ORAL at 21:50

## 2020-07-26 RX ADMIN — MULTIPLE VITAMINS W/ MINERALS TAB 1 TABLET: TAB at 08:31

## 2020-07-26 RX ADMIN — Medication 220 MG: at 17:24

## 2020-07-26 RX ADMIN — BUDESONIDE 9 MG: 3 CAPSULE, GELATIN COATED ORAL at 08:31

## 2020-07-26 RX ADMIN — Medication 220 MG: at 08:31

## 2020-07-26 RX ADMIN — CEFTRIAXONE SODIUM 2 G: 2 INJECTION, POWDER, FOR SOLUTION INTRAMUSCULAR; INTRAVENOUS at 23:17

## 2020-07-26 RX ADMIN — TRAMADOL HYDROCHLORIDE 50 MG: 50 TABLET ORAL at 23:17

## 2020-07-26 RX ADMIN — FOLIC ACID 1 MG: 1 TABLET ORAL at 08:32

## 2020-07-26 RX ADMIN — DOXYCYCLINE 100 MG: 100 INJECTION, POWDER, LYOPHILIZED, FOR SOLUTION INTRAVENOUS at 21:50

## 2020-07-26 RX ADMIN — SODIUM CHLORIDE 50 ML/HR: 9 INJECTION, SOLUTION INTRAVENOUS at 08:43

## 2020-07-26 RX ADMIN — FLUTICASONE PROPIONATE 2 SPRAY: 50 SPRAY, METERED NASAL at 08:34

## 2020-07-26 RX ADMIN — LORATADINE 10 MG: 10 TABLET ORAL at 08:32

## 2020-07-26 RX ADMIN — Medication 10 ML: at 05:56

## 2020-07-26 RX ADMIN — TRAMADOL HYDROCHLORIDE 50 MG: 50 TABLET ORAL at 06:17

## 2020-07-26 RX ADMIN — LORAZEPAM 0.5 MG: 0.5 TABLET ORAL at 21:50

## 2020-07-26 RX ADMIN — GUAIFENESIN 1200 MG: 600 TABLET ORAL at 08:32

## 2020-07-26 NOTE — PROGRESS NOTES
Problem: Falls - Risk of  Goal: *Absence of Falls  Description: Document [place] Fall Risk and appropriate interventions in the flowsheet. Outcome: Progressing Towards Goal  Note: Fall Risk Interventions:            Medication Interventions: Evaluate medications/consider consulting pharmacy                   Problem: Activity Intolerance  Goal: *Able to remain out of bed as prescribed  Outcome: Progressing Towards Goal     Problem: Airway Clearance - Ineffective  Goal: Achieve or maintain patent airway  Outcome: Progressing Towards Goal     Problem: Gas Exchange - Impaired  Goal: Promote optimal lung function  Outcome: Progressing Towards Goal  Note: Using incentive spirometer regularly. Problem: Breathing Pattern - Ineffective  Goal: Ability to achieve and maintain a regular respiratory rate  Outcome: Progressing Towards Goal     Problem:  Body Temperature -  Risk of, Imbalanced  Goal: Ability to maintain a body temperature within defined limits  Outcome: Progressing Towards Goal  Goal: Will regain or maintain usual level of consciousness  Outcome: Progressing Towards Goal  Goal: Complications related to the disease process, condition or treatment will be avoided or minimized  Outcome: Progressing Towards Goal     Problem: Isolation Precautions - Risk of Spread of Infection  Goal: Prevent transmission of infectious organism to others  Outcome: Progressing Towards Goal     Problem: Nutrition Deficits  Goal: Optimize nutrtional status  Outcome: Progressing Towards Goal     Problem: Risk for Fluid Volume Deficit  Goal: Maintain normal heart rhythm  Outcome: Progressing Towards Goal  Goal: Maintain absence of muscle cramping  Outcome: Progressing Towards Goal  Goal: Maintain normal serum potassium, sodium, calcium, phosphorus, and pH  Outcome: Progressing Towards Goal     Problem: Loneliness or Risk for Loneliness  Goal: Demonstrate positive use of time alone when socialization is not possible  Outcome: Progressing Towards Goal     Problem: Fatigue  Goal: Verbalize increase energy and improved vitality  Outcome: Progressing Towards Goal     Problem: Infection - Risk of, Central Venous Catheter-Associated Bloodstream Infection  Goal: *Absence of infection signs and symptoms  Outcome: Progressing Towards Goal

## 2020-07-26 NOTE — PROGRESS NOTES
Hospitalist Progress Note    2020  Admit Date: 2020  7:37 PM   NAME: Kobe Bullock   :  1960   MRN:  [id number]   Attending: Kalli Staley DO  PCP:  Delmis Craig MD    SUBJECTIVE:   Patient is a 57 y/o male with hx MDS, multiple myeloma, anemia, HLD presents to ED with worsening shortness of breath, hypoxia (RA 85%), tachypnea, worsening severe headache, loss of taste and smell. He was tested for Covid-19 on  and was positive. His initial symptoms actually started at least 2 weeks ago. Thinks he got it from his wife who works at [email or web address]. He received a blood transfusion for anemia yesterday. In ED he had tachypnea and diffuse rales. His chest xray shows diffuse infiltrates. Was started on IV decadron as well as rocephin/azithromycin. Hospitalist service consulted for admission. Interval History (): patient examined at bedside. No acute events overnight. Breathing is a little bit better, still having productive cough. No fevers/chills. No chest pain \"except when I cough. \" No abdominal pain. Review of Systems negative with exception of pertinent positives noted above  PHYSICAL EXAM     Visit Vitals  /64   Pulse 84   Temp 98.2 °F (36.8 °C)   Resp 26   Ht 6' (1.829 m)   Wt 105.4 kg (232 lb 5.8 oz)   SpO2 94%   BMI 31.51 kg/m²      Temp (24hrs), Av.3 °F (36.8 °C), Min:98 °F (36.7 °C), Max:98.6 °F (37 °C)    Oxygen Therapy  O2 Sat (%): 94 % (20 1149)  Pulse via Oximetry: 83 beats per minute (20 1002)  O2 Device: Hi flow nasal cannula (20 1002)  O2 Flow Rate (L/min): 5 l/min (20 1002)    Intake/Output Summary (Last 24 hours) at 2020 1229  Last data filed at 2020 1149  Gross per 24 hour   Intake 1493.5 ml   Output 2500 ml   Net -1006.5 ml      General: No acute distress    Lungs:  Diffuse crackles on anterior auscultation, dyspneic. Productive cough.    Heart:  Regular rate and rhythm,  No murmur, rub, or gallop  Abdomen: Soft, Non distended, Non tender, Positive bowel sounds  Extremities: No cyanosis, clubbing or edema  Neurologic:  No focal deficits    ASSESSMENT      Active Hospital Problems    Diagnosis Date Noted    Acute respiratory failure with hypoxia ([facility] 07/23/2020    COVID-19 virus infection 07/23/2020    Headache 07/23/2020    Pulmonary infiltrates on CXR 07/23/2020    Obesity 11/29/2017    Multiple myeloma ([facility] [facility] 07/01/2016    MDS (myelodysplastic syndrome) ([facility] [facility]      Plan:    # Acute hypoxic respiratory failure, multifactorial  - positive for COVID 19  - started on Decadron 6 mg IV (day 3)  - consent signed for convalescent plasma, ordered and pending  - consult ID to see if candidate for remdesevir  - received Rocephin/azithromycin in ED for CAP coverage  - continue Rocephin  - switch azithromycin to doxycycline due to prolonged QT interval on EKG  - hold Lasix due to small bump in SCr and try gentle hydration  - CT chest with contrast negative for PE  - ordered TTE, patient with prior echocardiogram in 2016 showing Grade I diastolic dysfunction  - strict I's/O's  - wean supplemental oxygen as tolerated  - pulmonary signed off    # History of MDS/myeloma  - Hgb/Hct is stable without overt signs of bleeding  - gets chronic blood transfusions as outpatient  - trend Hgb/Hct  - transfuse for Hgb<7  - has received several blood transfusions recently prior to admission  - follow-up with Heme/Onc    F/E/N: no fluids, replete electrolytes as needed, regular diet    Ppx: Lovenox for VTE    Code Status: FULL CODE    Disposition: pending clinical improvement with plan as above. Discussed with patient at bedside. All questions answered.      Signed By: Liat Dobbins DO     July 26, 2020

## 2020-07-26 NOTE — PROGRESS NOTES
Pt is resting in bed at this time. Pt is on 5L NC. Pt is denies pain at this time. Pt has no s/sx of distress at this time. Safety measures in place. Pt has call light within reach and is encouraged to call for assistance If needed. Will continue to monitor.

## 2020-07-26 NOTE — PROGRESS NOTES
Patient complaining of headache, rating pain 5 out of 10 on numeric pain scale. Administered PRN tramadol. See MAR.

## 2020-07-26 NOTE — PROGRESS NOTES
Patient continues to tolerate convalescent plasma transfusion well 30 minutes following initiation. Resting in bed, using his phone. Will continue to monitor.

## 2020-07-26 NOTE — PROGRESS NOTES
Patient stable on 7 LPM per nurse. Continue IS. Will not see to minimize PPE use.   Call if condition changes  No charge    Mitra Esquivel MD

## 2020-07-26 NOTE — PROGRESS NOTES
End of Shift Note. Patient resting in bed quietly. Bed/chair wheels locked. Call light within reach. Preparing to give handoff to oncoming RN.

## 2020-07-26 NOTE — PROGRESS NOTES
PRN Tramadol given at patient's request due to complaint of moderate headache.  Says tramadol provided relief earlier in the day    Chantal Wolf

## 2020-07-26 NOTE — PROGRESS NOTES
SBAR received from Brown Gunderson RN. Patient stable, lying in bed, using incentive spirometer, in no apparent distress. Call light within reach. Droplet plus precautions maintained.

## 2020-07-26 NOTE — PROGRESS NOTES
Patient tolerating convalescent plasma transfusion well 15 minutes following initiation. Increased rate of transfusion from 60 mL/hr to 250 mL/hr. Will continue to monitor.

## 2020-07-26 NOTE — PROGRESS NOTES
Problem: Falls - Risk of  Goal: *Absence of Falls  Description: Document [place] Fall Risk and appropriate interventions in the flowsheet. Outcome: Progressing Towards Goal  Note: Fall Risk Interventions:            Medication Interventions: Evaluate medications/consider consulting pharmacy, Teach patient to arise slowly                   Problem: Patient Education: Go to Patient Education Activity  Goal: Patient/Family Education  Outcome: Progressing Towards Goal     Problem: Discharge Planning  Goal: *Discharge to safe environment  Outcome: Progressing Towards Goal  Goal: *Knowledge of medication management  Outcome: Progressing Towards Goal  Goal: *Knowledge of discharge instructions  Outcome: Progressing Towards Goal     Problem: Patient Education: Go to Patient Education Activity  Goal: Patient/Family Education  Outcome: Progressing Towards Goal     Problem: Activity Intolerance  Goal: *Oxygen saturation during activity within specified parameters  Outcome: Progressing Towards Goal  Goal: *Able to remain out of bed as prescribed  Outcome: Progressing Towards Goal     Problem: Patient Education: Go to Patient Education Activity  Goal: Patient/Family Education  Outcome: Progressing Towards Goal     Problem: Airway Clearance - Ineffective  Goal: Achieve or maintain patent airway  Outcome: Progressing Towards Goal     Problem: Gas Exchange - Impaired  Goal: Absence of hypoxia  Outcome: Progressing Towards Goal  Goal: Promote optimal lung function  Outcome: Progressing Towards Goal     Problem: Breathing Pattern - Ineffective  Goal: Ability to achieve and maintain a regular respiratory rate  Outcome: Progressing Towards Goal     Problem:  Body Temperature -  Risk of, Imbalanced  Goal: Ability to maintain a body temperature within defined limits  Outcome: Progressing Towards Goal  Goal: Will regain or maintain usual level of consciousness  Outcome: Progressing Towards Goal  Goal: Complications related to the disease process, condition or treatment will be avoided or minimized  Outcome: Progressing Towards Goal     Problem: Isolation Precautions - Risk of Spread of Infection  Goal: Prevent transmission of infectious organism to others  Outcome: Progressing Towards Goal     Problem: Nutrition Deficits  Goal: Optimize nutrtional status  Outcome: Progressing Towards Goal     Problem: Risk for Fluid Volume Deficit  Goal: Maintain normal heart rhythm  Outcome: Progressing Towards Goal  Goal: Maintain absence of muscle cramping  Outcome: Progressing Towards Goal  Goal: Maintain normal serum potassium, sodium, calcium, phosphorus, and pH  Outcome: Progressing Towards Goal     Problem: Loneliness or Risk for Loneliness  Goal: Demonstrate positive use of time alone when socialization is not possible  Outcome: Progressing Towards Goal     Problem: Fatigue  Goal: Verbalize increase energy and improved vitality  Outcome: Progressing Towards Goal     Problem: Patient Education: Go to Patient Education Activity  Goal: Patient/Family Education  Outcome: Progressing Towards Goal     Problem: Infection - Risk of, Central Venous Catheter-Associated Bloodstream Infection  Goal: *Absence of infection signs and symptoms  Outcome: Progressing Towards Goal     Problem: Patient Education: Go to Patient Education Activity  Goal: Patient/Family Education  Outcome: Progressing Towards Goal     Koby Kerns

## 2020-07-26 NOTE — PROGRESS NOTES
Patient moved from bed to chair causing decrease in oxygen saturation from 93% to 83%. Increased oxygen flow from 5 Lpm to 10 Lpm, which increased his oxygen saturation back to 93%. Decreased oxygen flow back to 5 Lpm. Oxygen saturation remains at 93%. Patient resting in chair, using incentive spirometer. Will continue to monitor.

## 2020-07-27 LAB
ALBUMIN SERPL-MCNC: 2.7 G/DL (ref 3.2–4.6)
ALBUMIN/GLOB SERPL: 0.8 {RATIO} (ref 1.2–3.5)
ALP SERPL-CCNC: 119 U/L (ref 50–136)
ALT SERPL-CCNC: 19 U/L (ref 12–65)
ANION GAP SERPL CALC-SCNC: 9 MMOL/L (ref 7–16)
AST SERPL-CCNC: 19 U/L (ref 15–37)
BASOPHILS # BLD: 0 K/UL (ref 0–0.2)
BASOPHILS NFR BLD: 0 % (ref 0–2)
BILIRUB DIRECT SERPL-MCNC: 0.1 MG/DL
BILIRUB SERPL-MCNC: 0.5 MG/DL (ref 0.2–1.1)
BLD PROD TYP BPU: NORMAL
BLOOD BANK CMNT PATIENT-IMP: NORMAL
BPU ID: NORMAL
BUN SERPL-MCNC: 24 MG/DL (ref 8–23)
CALCIUM SERPL-MCNC: 8.4 MG/DL (ref 8.3–10.4)
CHLORIDE SERPL-SCNC: 106 MMOL/L (ref 98–107)
CO2 SERPL-SCNC: 24 MMOL/L (ref 21–32)
CREAT SERPL-MCNC: 1.07 MG/DL (ref 0.8–1.5)
DIFFERENTIAL METHOD BLD: ABNORMAL
EOSINOPHIL # BLD: 0.2 K/UL (ref 0–0.8)
EOSINOPHIL NFR BLD: 3 % (ref 0.5–7.8)
ERYTHROCYTE [DISTWIDTH] IN BLOOD BY AUTOMATED COUNT: 16.7 % (ref 11.9–14.6)
GLOBULIN SER CALC-MCNC: 3.6 G/DL (ref 2.3–3.5)
GLUCOSE SERPL-MCNC: 89 MG/DL (ref 65–100)
HCT VFR BLD AUTO: 23.4 % (ref 41.1–50.3)
HGB BLD-MCNC: 8 G/DL (ref 13.6–17.2)
IMM GRANULOCYTES # BLD AUTO: 0.1 K/UL (ref 0–0.5)
IMM GRANULOCYTES NFR BLD AUTO: 1 % (ref 0–5)
LYMPHOCYTES # BLD: 0.7 K/UL (ref 0.5–4.6)
LYMPHOCYTES NFR BLD: 9 % (ref 13–44)
MAGNESIUM SERPL-MCNC: 1.8 MG/DL (ref 1.8–2.4)
MCH RBC QN AUTO: 32.8 PG (ref 26.1–32.9)
MCHC RBC AUTO-ENTMCNC: 34.2 G/DL (ref 31.4–35)
MCV RBC AUTO: 95.9 FL (ref 79.6–97.8)
MONOCYTES # BLD: 0.5 K/UL (ref 0.1–1.3)
MONOCYTES NFR BLD: 6 % (ref 4–12)
NEUTS SEG # BLD: 6.1 K/UL (ref 1.7–8.2)
NEUTS SEG NFR BLD: 81 % (ref 43–78)
NRBC # BLD: 0 K/UL (ref 0–0.2)
PLATELET # BLD AUTO: 239 K/UL (ref 150–450)
PMV BLD AUTO: 10.5 FL (ref 9.4–12.3)
POTASSIUM SERPL-SCNC: 3.4 MMOL/L (ref 3.5–5.1)
PROT SERPL-MCNC: 6.3 G/DL (ref 6.3–8.2)
RBC # BLD AUTO: 2.44 M/UL (ref 4.23–5.6)
SODIUM SERPL-SCNC: 139 MMOL/L (ref 136–145)
STATUS OF UNIT,%ST: NORMAL
UNIT DIVISION, %UDIV: NORMAL
WBC # BLD AUTO: 7.5 K/UL (ref 4.3–11.1)

## 2020-07-27 PROCEDURE — 94760 N-INVAS EAR/PLS OXIMETRY 1: CPT

## 2020-07-27 PROCEDURE — 74011250636 HC RX REV CODE- 250/636: Performed by: HOSPITALIST

## 2020-07-27 PROCEDURE — 80076 HEPATIC FUNCTION PANEL: CPT

## 2020-07-27 PROCEDURE — 74011250637 HC RX REV CODE- 250/637: Performed by: INTERNAL MEDICINE

## 2020-07-27 PROCEDURE — 80048 BASIC METABOLIC PNL TOTAL CA: CPT

## 2020-07-27 PROCEDURE — 85025 COMPLETE CBC W/AUTO DIFF WBC: CPT

## 2020-07-27 PROCEDURE — 74011250637 HC RX REV CODE- 250/637: Performed by: HOSPITALIST

## 2020-07-27 PROCEDURE — 74011250636 HC RX REV CODE- 250/636: Performed by: INTERNAL MEDICINE

## 2020-07-27 PROCEDURE — 65270000029 HC RM PRIVATE

## 2020-07-27 PROCEDURE — 74011000258 HC RX REV CODE- 258: Performed by: INTERNAL MEDICINE

## 2020-07-27 PROCEDURE — 77010033711 HC HIGH FLOW OXYGEN

## 2020-07-27 PROCEDURE — 83735 ASSAY OF MAGNESIUM: CPT

## 2020-07-27 RX ORDER — DOXYCYCLINE 100 MG/1
100 CAPSULE ORAL EVERY 12 HOURS
Status: DISCONTINUED | OUTPATIENT
Start: 2020-07-27 | End: 2020-07-30 | Stop reason: HOSPADM

## 2020-07-27 RX ADMIN — LORAZEPAM 0.5 MG: 0.5 TABLET ORAL at 21:57

## 2020-07-27 RX ADMIN — MELATONIN 1 TABLET: at 09:15

## 2020-07-27 RX ADMIN — CEFTRIAXONE SODIUM 2 G: 2 INJECTION, POWDER, FOR SOLUTION INTRAMUSCULAR; INTRAVENOUS at 21:57

## 2020-07-27 RX ADMIN — Medication 220 MG: at 07:11

## 2020-07-27 RX ADMIN — Medication 220 MG: at 17:32

## 2020-07-27 RX ADMIN — DOXYCYCLINE HYCLATE 100 MG: 100 CAPSULE ORAL at 20:28

## 2020-07-27 RX ADMIN — FOLIC ACID 1 MG: 1 TABLET ORAL at 07:10

## 2020-07-27 RX ADMIN — DOXYCYCLINE 100 MG: 100 INJECTION, POWDER, LYOPHILIZED, FOR SOLUTION INTRAVENOUS at 09:07

## 2020-07-27 RX ADMIN — LORATADINE 10 MG: 10 TABLET ORAL at 07:10

## 2020-07-27 RX ADMIN — PANTOPRAZOLE SODIUM 40 MG: 40 TABLET, DELAYED RELEASE ORAL at 06:27

## 2020-07-27 RX ADMIN — Medication 10 ML: at 06:27

## 2020-07-27 RX ADMIN — MULTIPLE VITAMINS W/ MINERALS TAB 1 TABLET: TAB at 07:09

## 2020-07-27 RX ADMIN — TRAMADOL HYDROCHLORIDE 50 MG: 50 TABLET ORAL at 20:28

## 2020-07-27 RX ADMIN — TRAMADOL HYDROCHLORIDE 50 MG: 50 TABLET ORAL at 07:11

## 2020-07-27 RX ADMIN — GUAIFENESIN 1200 MG: 600 TABLET ORAL at 07:10

## 2020-07-27 RX ADMIN — DEXAMETHASONE SODIUM PHOSPHATE 6 MG: 10 INJECTION INTRAMUSCULAR; INTRAVENOUS at 09:16

## 2020-07-27 RX ADMIN — Medication 10 ML: at 21:58

## 2020-07-27 RX ADMIN — FLUTICASONE PROPIONATE 2 SPRAY: 50 SPRAY, METERED NASAL at 07:12

## 2020-07-27 RX ADMIN — Medication 10 ML: at 09:12

## 2020-07-27 RX ADMIN — BUDESONIDE 9 MG: 3 CAPSULE, GELATIN COATED ORAL at 09:15

## 2020-07-27 RX ADMIN — Medication 500 MG: at 09:06

## 2020-07-27 RX ADMIN — GUAIFENESIN 1200 MG: 600 TABLET ORAL at 20:28

## 2020-07-27 RX ADMIN — ENOXAPARIN SODIUM 40 MG: 40 INJECTION SUBCUTANEOUS at 09:11

## 2020-07-27 NOTE — PROGRESS NOTES
Assisted patient from bedside recliner to bed on 5LNCHF; patient O2 sat 88%  Increased to 6LNCHF, O2 91%. Instructed patient on cough/deep breathing and slow movements. Encouraged patient to call for needs. Will continue to monitor.

## 2020-07-27 NOTE — PROGRESS NOTES
Assumed care of patient. Assessment completed and documented, see docflow. Patient awake in bed, alert and oriented in all spheres. Patient with c/o continuing headache. Tramadol given PO, see MAR. Respirations present; non labored on 10LNCHF. Encouraged to call for needs. Call light within reach. Will continue to monitor.

## 2020-07-27 NOTE — PROGRESS NOTES
Pt is resting in bed at this time. Pt is on 10L high flow NC. Pt has no s/sx of acute distress at this time. Pt states he has a headache at this time. Pt has call light within reach and is encouraged to call for assistance if needed. Report given to oncoming nurse Ana GREENE.

## 2020-07-27 NOTE — PROGRESS NOTES
SW reviewed patient's chart on this date. Patient continuing to require supplemental O2 (6L NCHF). At this time, no anticipated discharge needs. DC plan remains for patient to return to home when medically ready. SW will continue to monitor for possible home O2 and other potential DC needs during this admission.

## 2020-07-27 NOTE — PROGRESS NOTES
Patient stable on 5-6 LPM per nurse. Continue IS. Will not see to minimize PPE use. Nurse reports snores, obese and may have REYNA. If PMD agreeable and patient agreeable then consider APAP QHS at 6-16 cmH20 with Cflex -2 while here otherwise can f/u as outpatient to sleep center.    Call if condition changes   No charge     Orion Dave MD

## 2020-07-27 NOTE — PROGRESS NOTES
Hospitalist Progress Note    2020  Admit Date: 2020  7:37 PM   NAME: Corbin Torres   :  1960   MRN:  [id number]   Attending: Jena Mares DO  PCP:  Lia Gaston MD    SUBJECTIVE:   Patient is a 57 y/o male with hx MDS, multiple myeloma, anemia, HLD presents to ED with worsening shortness of breath, hypoxia (RA 85%), tachypnea, worsening severe headache, loss of taste and smell. He was tested for Covid-19 on  and was positive. His initial symptoms actually started at least 2 weeks ago. Thinks he got it from his wife who works at [employer]. He received a blood transfusion for anemia yesterday. In ED he had tachypnea and diffuse rales. His chest xray shows diffuse infiltrates. Was started on IV decadron as well as rocephin/azithromycin. Hospitalist service consulted for admission. Interval History (): patient examined at bedside. No acute events overnight. Breathing is a little bit better, still having productive cough. Having fevers overnight. No chest pain \"except when I cough. \" No abdominal pain. Review of Systems negative with exception of pertinent positives noted above  PHYSICAL EXAM     Visit Vitals  /58 (BP 1 Location: Left arm, BP Patient Position: Sitting)   Pulse 60   Temp 98.2 °F (36.8 °C)   Resp 18   Ht 6' (1.829 m)   Wt 105.4 kg (232 lb 5.8 oz)   SpO2 91%   BMI 31.51 kg/m²      Temp (24hrs), Av °F (36.7 °C), Min:97 °F (36.1 °C), Max:98.4 °F (36.9 °C)    Oxygen Therapy  O2 Sat (%): 91 % (20 1112)  Pulse via Oximetry: 86 beats per minute (20)  O2 Device: Hi flow nasal cannula (20)  O2 Flow Rate (L/min): 10 l/min (20)    Intake/Output Summary (Last 24 hours) at 2020 1223  Last data filed at 2020 1119  Gross per 24 hour   Intake 800 ml   Output 600 ml   Net 200 ml      General: No acute distress    Lungs:  Diffuse crackles on anterior auscultation, dyspneic. Productive cough.    Heart:  Regular rate and rhythm,  No murmur, rub, or gallop  Abdomen: Soft, Non distended, Non tender, Positive bowel sounds  Extremities: No cyanosis, clubbing or edema  Neurologic:  No focal deficits    ASSESSMENT      Active Hospital Problems    Diagnosis Date Noted    Acute respiratory failure with hypoxia ([facility] [address] 07/23/2020    COVID-19 virus infection 07/23/2020    Headache 07/23/2020    Pulmonary infiltrates on CXR 07/23/2020    Obesity 11/29/2017    Multiple myeloma ([facility] [address] 07/01/2016    MDS (myelodysplastic syndrome) ([facility] [address]      Plan:    # Acute hypoxic respiratory failure, multifactorial  - positive for COVID 19  - started on Decadron 6 mg IV (day 4)  - consent signed for convalescent plasma, received yesterday  - not a candidate for remdesevir as there is unlikely benefit given >7 days since diagnosis  - received Rocephin/azithromycin in ED for CAP coverage  - continue Rocephin (day 4)  - switch azithromycin to doxycycline due to prolonged QT interval on EKG (day 2)  - CT chest with contrast negative for PE  - ordered TTE, patient with prior echocardiogram in 2016 showing Grade I diastolic dysfunction and imaging on admission showed diffuse pulmonary edema necessitating use of IV Lasix (now stopped)  - strict I's/O's  - wean supplemental oxygen as tolerated  - pulmonary signed off, recommend CPAP at nighttime per provided settings    # History of MDS/myeloma  - Hgb/Hct is stable without overt signs of bleeding  - gets chronic blood transfusions as outpatient  - trend Hgb/Hct  - transfuse for Hgb<7  - has received several blood transfusions recently prior to admission  - follow-up with Heme/Onc    F/E/N: no fluids, replete electrolytes as needed, regular diet    Ppx: Lovenox for VTE    Code Status: FULL CODE    Disposition: pending clinical improvement with plan as above, ?discharge in next 2-3 days. Discussed with patient at bedside. All questions answered.      Signed By: Curtis Nath DO     July 27, 2020

## 2020-07-27 NOTE — PROGRESS NOTES
Pt is resting in bed at this time. Pt is on 6L high flow NC. Pt is alert and oriented times 4. Pt states that he would like a pain pill at this time. Pt has no s/sx of acute distress at this time. Pt has call light within reach and is encouraged to call for assistance if needed. Safety measures in place. Will continue to monitor.

## 2020-07-28 PROBLEM — R29.818 SUSPECTED SLEEP APNEA: Status: ACTIVE | Noted: 2020-07-28

## 2020-07-28 LAB
ALBUMIN SERPL-MCNC: 2.7 G/DL (ref 3.2–4.6)
ALBUMIN/GLOB SERPL: 0.8 {RATIO} (ref 1.2–3.5)
ALP SERPL-CCNC: 106 U/L (ref 50–136)
ALT SERPL-CCNC: 18 U/L (ref 12–65)
ANION GAP SERPL CALC-SCNC: 6 MMOL/L (ref 7–16)
AST SERPL-CCNC: 14 U/L (ref 15–37)
BACTERIA SPEC CULT: NORMAL
BASOPHILS # BLD: 0 K/UL (ref 0–0.2)
BASOPHILS NFR BLD: 0 % (ref 0–2)
BILIRUB DIRECT SERPL-MCNC: 0.2 MG/DL
BILIRUB SERPL-MCNC: 0.5 MG/DL (ref 0.2–1.1)
BUN SERPL-MCNC: 18 MG/DL (ref 8–23)
CALCIUM SERPL-MCNC: 8.4 MG/DL (ref 8.3–10.4)
CHLORIDE SERPL-SCNC: 109 MMOL/L (ref 98–107)
CO2 SERPL-SCNC: 24 MMOL/L (ref 21–32)
CREAT SERPL-MCNC: 0.92 MG/DL (ref 0.8–1.5)
DIFFERENTIAL METHOD BLD: ABNORMAL
EOSINOPHIL # BLD: 0.2 K/UL (ref 0–0.8)
EOSINOPHIL NFR BLD: 3 % (ref 0.5–7.8)
ERYTHROCYTE [DISTWIDTH] IN BLOOD BY AUTOMATED COUNT: 16.3 % (ref 11.9–14.6)
GLOBULIN SER CALC-MCNC: 3.3 G/DL (ref 2.3–3.5)
GLUCOSE SERPL-MCNC: 118 MG/DL (ref 65–100)
HCT VFR BLD AUTO: 23 % (ref 41.1–50.3)
HGB BLD-MCNC: 7.4 G/DL (ref 13.6–17.2)
IMM GRANULOCYTES # BLD AUTO: 0.1 K/UL (ref 0–0.5)
IMM GRANULOCYTES NFR BLD AUTO: 1 % (ref 0–5)
LYMPHOCYTES # BLD: 0.6 K/UL (ref 0.5–4.6)
LYMPHOCYTES NFR BLD: 9 % (ref 13–44)
MAGNESIUM SERPL-MCNC: 1.9 MG/DL (ref 1.8–2.4)
MCH RBC QN AUTO: 31.2 PG (ref 26.1–32.9)
MCHC RBC AUTO-ENTMCNC: 32.2 G/DL (ref 31.4–35)
MCV RBC AUTO: 97 FL (ref 79.6–97.8)
MONOCYTES # BLD: 0.4 K/UL (ref 0.1–1.3)
MONOCYTES NFR BLD: 6 % (ref 4–12)
NEUTS SEG # BLD: 5.5 K/UL (ref 1.7–8.2)
NEUTS SEG NFR BLD: 82 % (ref 43–78)
NRBC # BLD: 0 K/UL (ref 0–0.2)
PLATELET # BLD AUTO: 243 K/UL (ref 150–450)
PMV BLD AUTO: 10.8 FL (ref 9.4–12.3)
POTASSIUM SERPL-SCNC: 3.6 MMOL/L (ref 3.5–5.1)
PROT SERPL-MCNC: 6 G/DL (ref 6.3–8.2)
RBC # BLD AUTO: 2.37 M/UL (ref 4.23–5.6)
SERVICE CMNT-IMP: NORMAL
SODIUM SERPL-SCNC: 139 MMOL/L (ref 136–145)
WBC # BLD AUTO: 6.7 K/UL (ref 4.3–11.1)

## 2020-07-28 PROCEDURE — 74011250636 HC RX REV CODE- 250/636: Performed by: HOSPITALIST

## 2020-07-28 PROCEDURE — 74011250637 HC RX REV CODE- 250/637: Performed by: INTERNAL MEDICINE

## 2020-07-28 PROCEDURE — 74011250636 HC RX REV CODE- 250/636: Performed by: INTERNAL MEDICINE

## 2020-07-28 PROCEDURE — 80076 HEPATIC FUNCTION PANEL: CPT

## 2020-07-28 PROCEDURE — 65270000029 HC RM PRIVATE

## 2020-07-28 PROCEDURE — 83735 ASSAY OF MAGNESIUM: CPT

## 2020-07-28 PROCEDURE — 85025 COMPLETE CBC W/AUTO DIFF WBC: CPT

## 2020-07-28 PROCEDURE — 80048 BASIC METABOLIC PNL TOTAL CA: CPT

## 2020-07-28 PROCEDURE — 74011250637 HC RX REV CODE- 250/637: Performed by: HOSPITALIST

## 2020-07-28 RX ORDER — CEFPODOXIME PROXETIL 200 MG/1
200 TABLET, FILM COATED ORAL EVERY 12 HOURS
Status: DISCONTINUED | OUTPATIENT
Start: 2020-07-28 | End: 2020-07-30 | Stop reason: HOSPADM

## 2020-07-28 RX ADMIN — TRAMADOL HYDROCHLORIDE 50 MG: 50 TABLET ORAL at 06:33

## 2020-07-28 RX ADMIN — BUDESONIDE 9 MG: 3 CAPSULE, GELATIN COATED ORAL at 10:00

## 2020-07-28 RX ADMIN — ACETAMINOPHEN 650 MG: 325 TABLET, FILM COATED ORAL at 16:37

## 2020-07-28 RX ADMIN — Medication 220 MG: at 17:22

## 2020-07-28 RX ADMIN — DOXYCYCLINE HYCLATE 100 MG: 100 CAPSULE ORAL at 10:00

## 2020-07-28 RX ADMIN — DEXAMETHASONE SODIUM PHOSPHATE 6 MG: 10 INJECTION INTRAMUSCULAR; INTRAVENOUS at 10:00

## 2020-07-28 RX ADMIN — FOLIC ACID 1 MG: 1 TABLET ORAL at 10:00

## 2020-07-28 RX ADMIN — Medication 220 MG: at 10:00

## 2020-07-28 RX ADMIN — MELATONIN 1 TABLET: at 10:00

## 2020-07-28 RX ADMIN — PANTOPRAZOLE SODIUM 40 MG: 40 TABLET, DELAYED RELEASE ORAL at 06:11

## 2020-07-28 RX ADMIN — MULTIPLE VITAMINS W/ MINERALS TAB 1 TABLET: TAB at 10:00

## 2020-07-28 RX ADMIN — ENOXAPARIN SODIUM 40 MG: 40 INJECTION SUBCUTANEOUS at 09:59

## 2020-07-28 RX ADMIN — GUAIFENESIN 1200 MG: 600 TABLET ORAL at 10:00

## 2020-07-28 RX ADMIN — Medication 10 ML: at 06:12

## 2020-07-28 RX ADMIN — LORAZEPAM 0.5 MG: 0.5 TABLET ORAL at 21:38

## 2020-07-28 RX ADMIN — Medication 10 ML: at 13:29

## 2020-07-28 RX ADMIN — DOXYCYCLINE HYCLATE 100 MG: 100 CAPSULE ORAL at 21:38

## 2020-07-28 RX ADMIN — Medication 10 ML: at 21:39

## 2020-07-28 RX ADMIN — Medication 500 MG: at 10:00

## 2020-07-28 RX ADMIN — GUAIFENESIN 1200 MG: 600 TABLET ORAL at 21:38

## 2020-07-28 RX ADMIN — LORATADINE 10 MG: 10 TABLET ORAL at 10:00

## 2020-07-28 RX ADMIN — CEFPODOXIME PROXETIL 200 MG: 200 TABLET, FILM COATED ORAL at 21:38

## 2020-07-28 RX ADMIN — FLUTICASONE PROPIONATE 2 SPRAY: 50 SPRAY, METERED NASAL at 09:00

## 2020-07-28 NOTE — PROGRESS NOTES
Pt is resting in bed at this time. Pt is on 6L highflow NC. Pt is alert and oriented times 4. Pt has just taken Tramadol for complaint of headache 6/10. Pt has no s/sx of distress at this time. Safety measures in place. Pt has call light within reach and is encouraged to call for assistance if needed. Will prepare report for oncoming nurse.

## 2020-07-28 NOTE — PROGRESS NOTES
Problem: Falls - Risk of  Goal: *Absence of Falls  Description: Document Eliot Austyn Fall Risk and appropriate interventions in the flowsheet. Outcome: Progressing Towards Goal  Note: Fall Risk Interventions:            Medication Interventions: Patient to call before getting OOB                   Problem:  Activity Intolerance  Goal: *Oxygen saturation during activity within specified parameters  Outcome: Progressing Towards Goal     Problem: Airway Clearance - Ineffective  Goal: Achieve or maintain patent airway  Outcome: Progressing Towards Goal     Problem: Gas Exchange - Impaired  Goal: Absence of hypoxia  Outcome: Progressing Towards Goal     Problem: Breathing Pattern - Ineffective  Goal: Ability to achieve and maintain a regular respiratory rate  Outcome: Progressing Towards Goal

## 2020-07-28 NOTE — PROGRESS NOTES
's visit via telephone call attempted. The call was unanswered. Chaplains remain available follow-up.      Brianna Canela MDIV, [address] [facility]

## 2020-07-28 NOTE — PROGRESS NOTES
Pt resting in bed. Respirations present on 6L NC. No signs of distress. No needs expressed. Bed low and locked. Call light within reach.  Report received from Esequiel Graham, [address] [facility]

## 2020-07-28 NOTE — PROGRESS NOTES
Noted orders for NS @50ml/hr noted last administration 7/26/2020 0843. Dr. Osvaldo Ledesma notified. Orders received to discontinue fluids.

## 2020-07-28 NOTE — PROGRESS NOTES
Pt is resting in bed. Respirations present on 6L HighFlow NC. No signs of distress. No needs expressed. Bed low and locked. Call light within reach.  Report given to Deonte Mcpherson

## 2020-07-28 NOTE — PROGRESS NOTES
Hospitalist Progress Note    Patient: Shawanda Heller MRN: [id number]  SSN: [id number]    YOB: 1960  Age: 56 y.o. Sex: male      Admit Date: 7/23/2020    LOS: 5 days     Subjective:     56year old CM with PMH of MDS, multiple myeloma, anemia, HLD presents to ED with worsening shortness of breath, hypoxia (RA 85%), tachypnea, worsening severe headache, loss of taste and smell admitted on 7/23/20. He was tested for Covid-19 on 7/16 and was positive. His initial symptoms actually started at least 2 weeks prior. Thinks he got it from his wife who works at [employer]. He receives chronic blood transfusions for anemia. In ED he had tachypnea and diffuse rales. His chest xray shows diffuse infiltrates. Was started on IV decadron as well as rocephin/azithromycin. 7/28 - Feels about the same today. Still with mild DURAN. Denies F/C/N/V. Denies CP. Review of systems negative except stated above. Objective:     Visit Vitals  /63   Pulse 99   Temp 98.2 °F (36.8 °C)   Resp 18   Ht 6' (1.829 m)   Wt 105.4 kg (232 lb 5.8 oz)   SpO2 99%   BMI 31.51 kg/m²      Oxygen Therapy  O2 Sat (%): 99 % (07/28/20 1138)  Pulse via Oximetry: 86 beats per minute (07/27/20 0937)  O2 Device: Hi flow nasal cannula (07/28/20 0830)  O2 Flow Rate (L/min): 6 l/min (07/28/20 0830)      Intake and Output:     Intake/Output Summary (Last 24 hours) at 7/28/2020 1340  Last data filed at 7/28/2020 1318  Gross per 24 hour   Intake 720 ml   Output 1900 ml   Net -1180 ml         Physical Exam:   GENERAL: alert, cooperative, no distress, appears stated age  EYE: conjunctivae/corneas clear. PERRL. THROAT & NECK: normal and no erythema or exudates noted. LUNG: clear to auscultation bilaterally  HEART: regular rate and rhythm, S1S2, no murmur, no JVD  ABDOMEN: soft, non-tender, non-distended. Bowel sounds normal.   EXTREMITIES:  No edema, 2+ pedal/radial pulses bilaterally  SKIN: no rash or abnormalities  NEUROLOGIC: A&Ox3. Cranial nerves 2-12 grossly intact. Lab/Data Review:  Recent Results (from the past 24 hour(s))   MAGNESIUM    Collection Time: 07/28/20  4:11 AM   Result Value Ref Range    Magnesium 1.9 1.8 - 2.4 mg/dL   HEPATIC FUNCTION PANEL    Collection Time: 07/28/20  4:11 AM   Result Value Ref Range    Protein, total 6.0 (L) 6.3 - 8.2 g/dL    Albumin 2.7 (L) 3.2 - 4.6 g/dL    Globulin 3.3 2.3 - 3.5 g/dL    A-G Ratio 0.8 (L) 1.2 - 3.5      Bilirubin, total 0.5 0.2 - 1.1 MG/DL    Bilirubin, direct 0.2 <0.4 MG/DL    Alk. phosphatase 106 50 - 136 U/L    AST (SGOT) 14 (L) 15 - 37 U/L    ALT (SGPT) 18 12 - 65 U/L   CBC WITH AUTOMATED DIFF    Collection Time: 07/28/20  4:11 AM   Result Value Ref Range    WBC 6.7 4.3 - 11.1 K/uL    RBC 2.37 (L) 4.23 - 5.6 M/uL    HGB 7.4 (L) 13.6 - 17.2 g/dL    HCT 23.0 (L) 41.1 - 50.3 %    MCV 97.0 79.6 - 97.8 FL    MCH 31.2 26.1 - 32.9 PG    MCHC 32.2 31.4 - 35.0 g/dL    RDW 16.3 (H) 11.9 - 14.6 %    PLATELET 068 923 - 142 K/uL    MPV 10.8 9.4 - 12.3 FL    ABSOLUTE NRBC 0.00 0.0 - 0.2 K/uL    DF AUTOMATED      NEUTROPHILS 82 (H) 43 - 78 %    LYMPHOCYTES 9 (L) 13 - 44 %    MONOCYTES 6 4.0 - 12.0 %    EOSINOPHILS 3 0.5 - 7.8 %    BASOPHILS 0 0.0 - 2.0 %    IMMATURE GRANULOCYTES 1 0.0 - 5.0 %    ABS. NEUTROPHILS 5.5 1.7 - 8.2 K/UL    ABS. LYMPHOCYTES 0.6 0.5 - 4.6 K/UL    ABS. MONOCYTES 0.4 0.1 - 1.3 K/UL    ABS. EOSINOPHILS 0.2 0.0 - 0.8 K/UL    ABS. BASOPHILS 0.0 0.0 - 0.2 K/UL    ABS. IMM.  GRANS. 0.1 0.0 - 0.5 K/UL   METABOLIC PANEL, BASIC    Collection Time: 07/28/20  4:11 AM   Result Value Ref Range    Sodium 139 136 - 145 mmol/L    Potassium 3.6 3.5 - 5.1 mmol/L    Chloride 109 (H) 98 - 107 mmol/L    CO2 24 21 - 32 mmol/L    Anion gap 6 (L) 7 - 16 mmol/L    Glucose 118 (H) 65 - 100 mg/dL    BUN 18 8 - 23 MG/DL    Creatinine 0.92 0.8 - 1.5 MG/DL    GFR est AA >60 >60 ml/min/1.73m2    GFR est non-AA >60 >60 ml/min/1.73m2    Calcium 8.4 8.3 - 10.4 MG/DL       SARS-CoV-2 Lab Results  \"Novel Coronavirus\" Test: No results found for: COV2NT   \"Emergent Disease\" Test: No results found for: EDPR  \"SARS-COV-2\" Test: No results found for: XGCOVT  \"Precision Labs\" Test: No results found for: RSLT  Rapid Test: No results found for: COVR        Imaging:  Xr Chest Sngl V    Result Date: 7/23/2020  EXAM: Single view chest radiograph. INDICATION: Shortness of breath and chest pain, Covid + COMPARISON: None. FINDINGS: Right-sided chest port tip terminating in the cavoatrial junction. No pneumothorax or pleural effusion. Diffuse pulmonary interstitial edema. Multifocal pulmonary consolidations with mid and lower lung predominance. IMPRESSION: 1. Multifocal airspace consolidations with mid and lower lung predominance highly suspicious for atypical multifocal pneumonia. 2. Diffuse pulmonary interstitial edema. 3. Right-sided chest port tip terminating in the cavoatrial junction. Ct Chest W Cont    Result Date: 7/25/2020  CT OF THE CHEST WITH INTRAVENOUS CONTRAST, 7/25/2020 Indication: Worsening hypoxemia in Covid patient. Comparison: Chest x-ray 7/23/2020 Technique:   2.5 mm axial scans from above the aortic arch to the lung bases following the uneventful administration of 70 mL of Isovue-370. Intravenous contrast was given to evaluate for pulmonary embolism. All CT scans performed at this facility use one or all of the following: Automated exposure control, adjustment of the mA and/or kVp according to patient's size, iterative reconstruction. Findings: The base of the neck is unremarkable in appearance. Prominent likely reactive mediastinal, and hilar lymph nodes are seen. The thoracic aorta is normal in caliber. Opacification of the pulmonary arteries is suboptimal and furthermore limited by patient breathing motion artifact. No defined convincing filling defects are seen to suggest pulmonary embolism. Evaluation with lung windows demonstrates symmetric bilateral lung infiltrates.  When the  image of today's study is compared to the prior chest x-ray, this does not appear significantly changed. Trace likely reactive bilateral pleural effusions are seen. Lungs are expanded without evidence for pneumothorax. Multiple subacute to chronic appearing bilateral rib fractures are seen. .  Limited evaluation of the upper abdomen demonstrates no acute abnormality. IMPRESSION:  1. No evidence for pulmonary embolism. 2. Symmetric bilateral lung infiltrates consistent with reported history of colon 19 infection. Likely reactive lymph nodes and trace bilateral pleural effusions are seen as described above. No results found for this visit on 07/23/20. Cultures:   All Micro Results     Procedure Component Value Units Date/Time    CULTURE, BLOOD [[id number]] Collected:  07/23/20 2052    Order Status:  Completed Specimen:  Blood Updated:  07/28/20 0859     Special Requests: --        NO SPECIAL REQUESTS  LATERAL PORT       Culture result: NO GROWTH 5 DAYS       CULTURE, BLOOD [[id number]] Collected:  07/23/20 2015    Order Status:  Canceled Specimen:  Blood           Assessment/Plan:     Principal Problem:    Acute respiratory failure with hypoxia ([facility] [address] (7/23/2020)  - Slowly improving  - Continue Decadron  - S/P Convalescent plasma  - Not a candidate for Remdesivir  - Continue Vitamin C + Zinc  - Continue Vitamin D  - Continue Lovenox  - Wean oxygen as appropriate    Active Problems:    COVID-19 virus infection (7/23/2020)  - Slowly improving  - Continue Decadron  - S/P Convalescent plasma  - Not a candidate for Remdesivir  - Continue Vitamin C + Zinc  - Continue Vitamin D  - Continue Lovenox      Pulmonary infiltrates on CXR (7/23/2020)  - Due to COVID  - Change Ceftriaxone to Vantin  - Continue PO Doxy  - WBC stable  - Procal 0.94      Headache (7/23/2020)  - Likely due to viral illness  - Continue to monitor      MDS (myelodysplastic syndrome) (HCC) ()  - Blood counts dropping, but not to transfusion levels  - Check CBC daily      Multiple myeloma ([facility] [address] (7/1/2016)  - Blood counts dropping, but not to transfusion levels  - Check CBC daily      Obesity (11/29/2017)      Suspected sleep apnea (7/28/2020)    Today's Plan: Continue Decadron.     DIET REGULAR    DVT Prophylaxis: Lovenox    Discharge Plan: Home in 1-2 days      Signed By: Torey Borrego DO     July 28, 2020

## 2020-07-29 LAB
ALBUMIN SERPL-MCNC: 2.6 G/DL (ref 3.2–4.6)
ALBUMIN/GLOB SERPL: 0.8 {RATIO} (ref 1.2–3.5)
ALP SERPL-CCNC: 96 U/L (ref 50–136)
ALT SERPL-CCNC: 18 U/L (ref 12–65)
ANION GAP SERPL CALC-SCNC: 6 MMOL/L (ref 7–16)
AST SERPL-CCNC: 9 U/L (ref 15–37)
BASOPHILS # BLD: 0 K/UL (ref 0–0.2)
BASOPHILS NFR BLD: 0 % (ref 0–2)
BILIRUB DIRECT SERPL-MCNC: 0.1 MG/DL
BILIRUB SERPL-MCNC: 0.6 MG/DL (ref 0.2–1.1)
BUN SERPL-MCNC: 18 MG/DL (ref 8–23)
CALCIUM SERPL-MCNC: 8.4 MG/DL (ref 8.3–10.4)
CHLORIDE SERPL-SCNC: 110 MMOL/L (ref 98–107)
CO2 SERPL-SCNC: 24 MMOL/L (ref 21–32)
CREAT SERPL-MCNC: 0.93 MG/DL (ref 0.8–1.5)
DIFFERENTIAL METHOD BLD: ABNORMAL
EOSINOPHIL # BLD: 0.2 K/UL (ref 0–0.8)
EOSINOPHIL NFR BLD: 3 % (ref 0.5–7.8)
ERYTHROCYTE [DISTWIDTH] IN BLOOD BY AUTOMATED COUNT: 16.1 % (ref 11.9–14.6)
GLOBULIN SER CALC-MCNC: 3.4 G/DL (ref 2.3–3.5)
GLUCOSE SERPL-MCNC: 91 MG/DL (ref 65–100)
HCT VFR BLD AUTO: 22.5 % (ref 41.1–50.3)
HGB BLD-MCNC: 7.3 G/DL (ref 13.6–17.2)
IMM GRANULOCYTES # BLD AUTO: 0.1 K/UL (ref 0–0.5)
IMM GRANULOCYTES NFR BLD AUTO: 1 % (ref 0–5)
LYMPHOCYTES # BLD: 0.8 K/UL (ref 0.5–4.6)
LYMPHOCYTES NFR BLD: 12 % (ref 13–44)
MAGNESIUM SERPL-MCNC: 1.9 MG/DL (ref 1.8–2.4)
MCH RBC QN AUTO: 31.5 PG (ref 26.1–32.9)
MCHC RBC AUTO-ENTMCNC: 32.4 G/DL (ref 31.4–35)
MCV RBC AUTO: 97 FL (ref 79.6–97.8)
MONOCYTES # BLD: 0.4 K/UL (ref 0.1–1.3)
MONOCYTES NFR BLD: 5 % (ref 4–12)
NEUTS SEG # BLD: 5.3 K/UL (ref 1.7–8.2)
NEUTS SEG NFR BLD: 78 % (ref 43–78)
NRBC # BLD: 0 K/UL (ref 0–0.2)
PLATELET # BLD AUTO: 282 K/UL (ref 150–450)
PMV BLD AUTO: 10.7 FL (ref 9.4–12.3)
POTASSIUM SERPL-SCNC: 3.8 MMOL/L (ref 3.5–5.1)
PROT SERPL-MCNC: 6 G/DL (ref 6.3–8.2)
RBC # BLD AUTO: 2.32 M/UL (ref 4.23–5.6)
SODIUM SERPL-SCNC: 140 MMOL/L (ref 136–145)
WBC # BLD AUTO: 6.8 K/UL (ref 4.3–11.1)

## 2020-07-29 PROCEDURE — 74011250636 HC RX REV CODE- 250/636: Performed by: INTERNAL MEDICINE

## 2020-07-29 PROCEDURE — 74011250637 HC RX REV CODE- 250/637: Performed by: HOSPITALIST

## 2020-07-29 PROCEDURE — 74011250637 HC RX REV CODE- 250/637: Performed by: INTERNAL MEDICINE

## 2020-07-29 PROCEDURE — 65270000029 HC RM PRIVATE

## 2020-07-29 PROCEDURE — 80048 BASIC METABOLIC PNL TOTAL CA: CPT

## 2020-07-29 PROCEDURE — 85025 COMPLETE CBC W/AUTO DIFF WBC: CPT

## 2020-07-29 PROCEDURE — 80076 HEPATIC FUNCTION PANEL: CPT

## 2020-07-29 PROCEDURE — 83735 ASSAY OF MAGNESIUM: CPT

## 2020-07-29 PROCEDURE — 74011250636 HC RX REV CODE- 250/636: Performed by: HOSPITALIST

## 2020-07-29 RX ADMIN — DEXAMETHASONE SODIUM PHOSPHATE 6 MG: 10 INJECTION INTRAMUSCULAR; INTRAVENOUS at 08:56

## 2020-07-29 RX ADMIN — Medication 10 ML: at 06:19

## 2020-07-29 RX ADMIN — GUAIFENESIN 1200 MG: 600 TABLET ORAL at 08:56

## 2020-07-29 RX ADMIN — DOXYCYCLINE HYCLATE 100 MG: 100 CAPSULE ORAL at 08:56

## 2020-07-29 RX ADMIN — Medication 220 MG: at 08:55

## 2020-07-29 RX ADMIN — FLUTICASONE PROPIONATE 2 SPRAY: 50 SPRAY, METERED NASAL at 09:00

## 2020-07-29 RX ADMIN — GUAIFENESIN 1200 MG: 600 TABLET ORAL at 21:57

## 2020-07-29 RX ADMIN — Medication 10 ML: at 21:57

## 2020-07-29 RX ADMIN — LORAZEPAM 0.5 MG: 0.5 TABLET ORAL at 21:57

## 2020-07-29 RX ADMIN — CEFPODOXIME PROXETIL 200 MG: 200 TABLET, FILM COATED ORAL at 21:57

## 2020-07-29 RX ADMIN — Medication 500 MG: at 08:56

## 2020-07-29 RX ADMIN — MELATONIN 1 TABLET: at 08:55

## 2020-07-29 RX ADMIN — TRAMADOL HYDROCHLORIDE 50 MG: 50 TABLET ORAL at 06:37

## 2020-07-29 RX ADMIN — CEFPODOXIME PROXETIL 200 MG: 200 TABLET, FILM COATED ORAL at 08:55

## 2020-07-29 RX ADMIN — PANTOPRAZOLE SODIUM 40 MG: 40 TABLET, DELAYED RELEASE ORAL at 06:23

## 2020-07-29 RX ADMIN — LORATADINE 10 MG: 10 TABLET ORAL at 08:55

## 2020-07-29 RX ADMIN — ENOXAPARIN SODIUM 40 MG: 40 INJECTION SUBCUTANEOUS at 08:55

## 2020-07-29 RX ADMIN — BUDESONIDE 9 MG: 3 CAPSULE, GELATIN COATED ORAL at 08:55

## 2020-07-29 RX ADMIN — FOLIC ACID 1 MG: 1 TABLET ORAL at 08:55

## 2020-07-29 RX ADMIN — Medication 10 ML: at 14:56

## 2020-07-29 RX ADMIN — MULTIPLE VITAMINS W/ MINERALS TAB 1 TABLET: TAB at 08:56

## 2020-07-29 RX ADMIN — DOXYCYCLINE HYCLATE 100 MG: 100 CAPSULE ORAL at 21:57

## 2020-07-29 RX ADMIN — Medication 220 MG: at 17:50

## 2020-07-29 NOTE — ADT AUTH CERT NOTES
Pneumonia - Care Day 6 (7/28/2020) by Yanira Moss RN  
 
   
Review Entered  Review Status 7/29/2020 16:44  Completed   
   
Criteria Review Care Day: 6 Care Date: 7/28/2020 Level of Care: Telemetry Guideline Day 3 Clinical Status   
(X) * Hemodynamic stability   
(X) * Afebrile, or temperature acceptable for next level of care   
(X) * Tachypnea absent   
( ) * Hypoxemia absent   
(X) * Mental status at baseline ( ) * Antibiotic regimen acceptable for next level of care ( ) * Discharge plans and education understood Activity   
(X) * Ambulatory Routes   
(X) * Oral hydration, medications, and diet Interventions ( ) * Oxygen absent or at baseline need (X) Incentive spirometry * Milestone Additional Notes 7/28   
  
98.3 77 18 95% 111/67 hfnc @ 6L   
  
7/28/2020 04:11   
RBC 2.37 (L) HGB 7.4 (L) HCT 23.0 (L) MCV 97.0 MCH 31.2 MCHC 32.2 RDW 16.3 (H) PLATELET 998 NEUTROPHILS 82 (H) LYMPHOCYTES 9 (L)   
  
7/28/2020 04:11 Chloride 109 (H) Anion gap 6 (L) Glucose 118 (H) BUN 18 Creatinine 0.92   
GFR est non-AA >60 Bilirubin, total 0.5 Bilirubin, direct 0.2 Protein, total 6.0 (L) Albumin 2.7 (L) Globulin 3.3 A-G Ratio 0.8 (L) ALT 18 AST 14 (L) Orders Tele IP, Full Code, VS, Cardiac Monitoring, Droplet Plus Contact, up ad guillermo, REG DIET, SCDS, IS, hematology consult, Infectious Disease Consult, Meds Vit C 500 mg po x1, Entocort 9 mg po x1, Decadron 6 mg IV x1, Doxy 100 mg po x2 , Lovenox 40 mg sc x1, Flonase 2 spray x1, Folic acid 1 mg po x1, Mucinex 1200 mg po x2 Claritin 10 mg po x1, Ativan 0.5 mg po x1, MVI po x1, Vit D 3 po x1, Protonix 40 mg po x1, Zinc 220 mg po x2, Ultram 50 mg po x2, Tylenol 650 mg po x1, Vantin 200 mg po x1,   
    
Nurses Notes Pt received 1 tab of Tramadol 50mg for complaint of headache 6/10. Will continue to monitor. Pt is resting in bed at this time. Pt is on 6L highflow NC. Pt is alert and oriented times 4. Pt has just taken Tramadol for complaint of headache 6/10. Pt has no s/sx of distress at this time. Safety measures in place. Pt has call light within reach and is encouraged to call for assistance if needed. Will prepare report for oncoming nurse. Pt resting in bed. Respirations present on 6L NC. IM NOTE   
  
7/28 - Feels about the same today. Still with mild DURAN. Denies F/C/N/V. Denies CP. Physical Exam:   
GENERAL: alert, cooperative, no distress, appears stated age EYE: conjunctivae/corneas clear. PERRL. THROAT & NECK: normal and no erythema or exudates noted. LUNG: clear to auscultation bilaterally HEART: regular rate and rhythm, S1S2, no murmur, no JVD ABDOMEN: soft, non-tender, non-distended. Bowel sounds normal.   
EXTREMITIES:  No edema, 2+ pedal/radial pulses bilaterally SKIN: no rash or abnormalities NEUROLOGIC: A&Ox3. Cranial nerves 2-12 grossly intact.   
    
Principal Problem:   
  Acute respiratory failure with hypoxia ([facility] [address] (7/23/2020) - Slowly improving   
- Continue Decadron - S/P Convalescent plasma   
- Not a candidate for Remdesivir - Continue Vitamin C + Zinc   
- Continue Vitamin D   
- Continue Lovenox - Wean oxygen as appropriate   
    
Active Problems:   
  COVID-19 virus infection (7/23/2020) - Slowly improving   
- Continue Decadron - S/P Convalescent plasma   
- Not a candidate for Remdesivir - Continue Vitamin C + Zinc   
- Continue Vitamin D   
- Continue Lovenox   
    
  Pulmonary infiltrates on CXR (7/23/2020) - Due to COVID   
- Change Ceftriaxone to Vantin   
- Continue PO Doxy - WBC stable - Procal 0.94   
    
  Headache (7/23/2020) - Likely due to viral illness   
- Continue to monitor   
    
  MDS (myelodysplastic syndrome) (HCC) () - Blood counts dropping, but not to transfusion levels - Check CBC daily     
  Multiple myeloma ([facility] [address] (7/1/2016) - Blood counts dropping, but not to transfusion levels - Check CBC daily   
    
  Obesity (11/29/2017)     
  Suspected sleep apnea (7/28/2020)   
  Today's Plan: Continue Decadron.   
    
DIET REGULAR   
    
DVT Prophylaxis: Lovenox   
    
Discharge Plan: Home in 1-2 days   
    
    
  
  
   
Pneumonia - Care Day 5 (7/27/2020) by Mike Pat RN  
 
   
Review Entered  Review Status 7/29/2020 16:44  Completed   
   
Criteria Review Care Day: 5 Care Date: 7/27/2020 Level of Care: Telemetry Guideline Day 3 Clinical Status   
(X) * Hemodynamic stability   
(X) * Afebrile, or temperature acceptable for next level of care   
(X) * Tachypnea absent   
( ) * Hypoxemia absent   
(X) * Mental status at baseline ( ) * Antibiotic regimen acceptable for next level of care ( ) * Discharge plans and education understood Activity   
(X) * Ambulatory Routes   
(X) * Oral hydration, medications, and diet Interventions ( ) * Oxygen absent or at baseline need (X) Incentive spirometry * Milestone Additional Notes 7/27   
  
98.2 81 18 94% 119/74 hfnc @ 10   
  
7/27/2020 05:20   
RBC 2.44 (L) HGB 8.0 (L) HCT 23.4 (L) MCV 95.9 MCH 32.8 MCHC 34.2   
RDW 16.7 (H) PLATELET 307 NEUTROPHILS 81 (H) LYMPHOCYTES 9 (L)   
  
7/27/2020 05:20 Potassium 3.4 (L) Chloride 106 Anion gap 9 Glucose 89 BUN 24 (H) Creatinine 1.07   
GFR est non-AA >60 Bilirubin, total 0.5 Bilirubin, direct 0.1 Protein, total 6.3 Albumin 2.7 (L) Globulin 3.6 (H) A-G Ratio 0.8 (L) Blood cx neg COVID 19 RESULTS 7/16 Component Value Flag Ref Range Units Status SARS-CoV-2, ANTONIO Detected Abnormal   Not Detected Final   
Comment:   
  
  
. Orders Tele IP, Full Code, VS, Cardiac Monitoring, Droplet Plus Contact, up ad guillermo, REG DIET, SCDS, IS, hematology consult, Infectious Disease Consult, Meds Vit C 500 mg po x1, Entocort 9 mg po x1, Decadron 6 mg IV x1, Doxy 100 mg po x1 Doxy 100 mg IV x1, Lovenox 40 mg sc x1, Flonase 2 spray x1, Folic acid 1 mg po x1, Mucinex 1200 mg po x2 Claritin 10 mg po x1, Ativan 0.5 mg po x1, MVI po x1, Vit D 3 po x1, Protonix 40 mg po x1, Zinc 220 mg po x2, KCL 40 meq po x1 Ultram 50 mg po x2, Rocephin 2 gmI V x1, Nurses Notes Pt is resting in bed at this time. Pt is on 10L high flow NC. Pt has no s/sx of acute distress at this time. Pt states he has a headache at this time. Pt has call light within reach and is encouraged to call for assistance if needed. Report given to oncoming nurse Ana GREENE. Assumed care of patient. Assessment completed and documented, see docflow. Patient awake in bed, alert and oriented in all spheres. Patient with c/o continuing headache. Tramadol given PO, see MAR. Respirations present; non labored on 10LNCHF. Encouraged to call for needs. Call light within reach. Will continue to monitor. Assisted patient from bedside recliner to bed on 5LNCHF; patient O2 sat 88% Increased to 6LNCHF, O2 91%. Instructed patient on cough/deep breathing and slow movements. Encouraged patient to call for needs. Will continue to monitor. Pt is resting in bed at this time. Pt is on 6L high flow NC. Pt is alert and oriented times 4. Pt states that he would like a pain pill at this time. Pt has no s/sx of acute distress at this time. Pt has call light within reach and is encouraged to call for assistance if needed. Safety measures in place. Will continue to monitor. Pt received 1 tab of Tramadol 50mg for complaint of headache 7/10. Pulmonary Note Patient stable on 5-6 LPM per nurse. Continue IS. Will not see to minimize PPE use. Nurse reports snores, obese and may have REYNA.  If PMD agreeable and patient agreeable then consider APAP QHS at 6-16 cmH20 with Cflex -2 while here otherwise can f/u as outpatient to sleep center. Call if condition changes No charge C M NOTE   
SW reviewed patient's chart on this date. Patient continuing to require supplemental O2 (6L NCHF). At this time, no anticipated discharge needs. DC plan remains for patient to return to home when medically ready. SW will continue to monitor for possible home O2 and other potential DC needs during this admission. IM NOTE Interval History (7/27): patient examined at bedside. No acute events overnight. Breathing is a little bit better, still having productive cough. Having fevers overnight. No chest pain \"except when I cough. \" No abdominal pain. Acute respiratory failure with hypoxia ([address] 07/23/2020   
o COVID-19 virus infection 07/23/2020   
o Headache 07/23/2020   
o Pulmonary infiltrates on CXR 07/23/2020   
o Obesity 11/29/2017   
o Multiple myeloma ([address] 07/01/2016   
o MDS (myelodysplastic syndrome) ([facility] [address] Plan:   
    
# Acute hypoxic respiratory failure, multifactorial   
- positive for COVID 19   
- started on Decadron 6 mg IV (day 4)   
- consent signed for convalescent plasma, received yesterday   
- not a candidate for remdesevir as there is unlikely benefit given >7 days since diagnosis - received Rocephin/azithromycin in ED for CAP coverage   
- continue Rocephin (day 4) - switch azithromycin to doxycycline due to prolonged QT interval on EKG (day 2) - CT chest with contrast negative for PE   
- ordered TTE, patient with prior echocardiogram in 2016 showing Grade I diastolic dysfunction and imaging on admission showed diffuse pulmonary edema necessitating use of IV Lasix (now stopped)   
- strict I's/O's   
- wean supplemental oxygen as tolerated - pulmonary signed off, recommend CPAP at nighttime per provided settings   
    
# History of MDS/myeloma   
- Hgb/Hct is stable without overt signs of bleeding   
- gets chronic blood transfusions as outpatient - trend Hgb/Hct   
- transfuse for Hgb<7   
- has received several blood transfusions recently prior to admission   
- follow-up with Heme/Onc   
    
F/E/N: no fluids, replete electrolytes as needed, regular diet   
    
Ppx: Lovenox for VTE   
    
Code Status: FULL CODE   
    
Disposition: pending clinical improvement with plan as above, ?discharge in next 2-3 days. Discussed with patient at bedside.  All questions answered.

## 2020-07-29 NOTE — PROGRESS NOTES
Pt is resting in bed at this time. Pt is on 6L high flow NC. Pt is alert and oriented times 4. Pt has no complaints of pain at this time. Pt has no s/sx of distress at this time. Safety measures in place. Pt has call light within reach and is encouraged to call for assistance if needed.

## 2020-07-29 NOTE — PROGRESS NOTES
Hospitalist Progress Note    Patient: Agata García MRN: [id number]  SSN: [id number]    YOB: 1960  Age: 56 y.o. Sex: male      Admit Date: 7/23/2020    LOS: 6 days     Subjective:     56year old CM with PMH of MDS, multiple myeloma, anemia, HLD presents to ED with worsening shortness of breath, hypoxia (RA 85%), tachypnea, worsening severe headache, loss of taste and smell admitted on 7/23/20. He was tested for Covid-19 on 7/16 and was positive. His initial symptoms actually started at least 2 weeks prior. Thinks he got it from his wife who works at [employer]. He receives chronic blood transfusions for anemia. In ED he had tachypnea and diffuse rales. His chest xray shows diffuse infiltrates. Was started on IV decadron as well as rocephin/azithromycin. 7/29 - Feels improved today. Still with mild DURAN, but improved. Denies F/C/N/V. Denies CP. Denies SOB at rest.    Review of systems negative except stated above. Objective:     Visit Vitals  /68 (BP 1 Location: Right arm, BP Patient Position: At rest)   Pulse 73   Temp 97.7 °F (36.5 °C)   Resp 18   Ht 6' (1.829 m)   Wt 105.4 kg (232 lb 5.8 oz)   SpO2 91%   BMI 31.51 kg/m²      Oxygen Therapy  O2 Sat (%): 91 % (07/29/20 0805)  Pulse via Oximetry: 86 beats per minute (07/27/20 0937)  O2 Device: Hi flow nasal cannula (07/28/20 1941)  O2 Flow Rate (L/min): 6 l/min (07/28/20 1941)      Intake and Output:     Intake/Output Summary (Last 24 hours) at 7/29/2020 0928  Last data filed at 7/28/2020 1746  Gross per 24 hour   Intake 480 ml   Output 850 ml   Net -370 ml         Physical Exam:   GENERAL: alert, cooperative, no distress, appears stated age  EYE: conjunctivae/corneas clear. PERRL. THROAT & NECK: normal and no erythema or exudates noted. LUNG: clear to auscultation bilaterally  HEART: regular rate and rhythm, S1S2, no murmur, no JVD  ABDOMEN: soft, non-tender, non-distended.  Bowel sounds normal.   EXTREMITIES:  No edema, 2+ pedal/radial pulses bilaterally  SKIN: no rash or abnormalities  NEUROLOGIC: A&Ox3. Cranial nerves 2-12 grossly intact. Lab/Data Review:  Recent Results (from the past 24 hour(s))   MAGNESIUM    Collection Time: 07/29/20  5:23 AM   Result Value Ref Range    Magnesium 1.9 1.8 - 2.4 mg/dL   HEPATIC FUNCTION PANEL    Collection Time: 07/29/20  5:23 AM   Result Value Ref Range    Protein, total 6.0 (L) 6.3 - 8.2 g/dL    Albumin 2.6 (L) 3.2 - 4.6 g/dL    Globulin 3.4 2.3 - 3.5 g/dL    A-G Ratio 0.8 (L) 1.2 - 3.5      Bilirubin, total 0.6 0.2 - 1.1 MG/DL    Bilirubin, direct 0.1 <0.4 MG/DL    Alk. phosphatase 96 50 - 136 U/L    AST (SGOT) 9 (L) 15 - 37 U/L    ALT (SGPT) 18 12 - 65 U/L   CBC WITH AUTOMATED DIFF    Collection Time: 07/29/20  5:23 AM   Result Value Ref Range    WBC 6.8 4.3 - 11.1 K/uL    RBC 2.32 (L) 4.23 - 5.6 M/uL    HGB 7.3 (L) 13.6 - 17.2 g/dL    HCT 22.5 (L) 41.1 - 50.3 %    MCV 97.0 79.6 - 97.8 FL    MCH 31.5 26.1 - 32.9 PG    MCHC 32.4 31.4 - 35.0 g/dL    RDW 16.1 (H) 11.9 - 14.6 %    PLATELET 387 223 - 274 K/uL    MPV 10.7 9.4 - 12.3 FL    ABSOLUTE NRBC 0.00 0.0 - 0.2 K/uL    DF AUTOMATED      NEUTROPHILS 78 43 - 78 %    LYMPHOCYTES 12 (L) 13 - 44 %    MONOCYTES 5 4.0 - 12.0 %    EOSINOPHILS 3 0.5 - 7.8 %    BASOPHILS 0 0.0 - 2.0 %    IMMATURE GRANULOCYTES 1 0.0 - 5.0 %    ABS. NEUTROPHILS 5.3 1.7 - 8.2 K/UL    ABS. LYMPHOCYTES 0.8 0.5 - 4.6 K/UL    ABS. MONOCYTES 0.4 0.1 - 1.3 K/UL    ABS. EOSINOPHILS 0.2 0.0 - 0.8 K/UL    ABS. BASOPHILS 0.0 0.0 - 0.2 K/UL    ABS. IMM.  GRANS. 0.1 0.0 - 0.5 K/UL   METABOLIC PANEL, BASIC    Collection Time: 07/29/20  5:23 AM   Result Value Ref Range    Sodium 140 136 - 145 mmol/L    Potassium 3.8 3.5 - 5.1 mmol/L    Chloride 110 (H) 98 - 107 mmol/L    CO2 24 21 - 32 mmol/L    Anion gap 6 (L) 7 - 16 mmol/L    Glucose 91 65 - 100 mg/dL    BUN 18 8 - 23 MG/DL    Creatinine 0.93 0.8 - 1.5 MG/DL    GFR est AA >60 >60 ml/min/1.73m2    GFR est non-AA >60 >60 ml/min/1.73m2 Calcium 8.4 8.3 - 10.4 MG/DL       SARS-CoV-2 Lab Results  \"Novel Coronavirus\" Test: No results found for: COV2NT   \"Emergent Disease\" Test: No results found for: EDPR  \"SARS-COV-2\" Test: No results found for: XGCOVT  \"[facility]ision Labs\" Test: No results found for: RSLT  Rapid Test: No results found for: COVR        Imaging:  Xr Chest Sngl V    Result Date: 7/23/2020  EXAM: Single view chest radiograph. INDICATION: Shortness of breath and chest pain, Covid + COMPARISON: None. FINDINGS: Right-sided chest port tip terminating in the cavoatrial junction. No pneumothorax or pleural effusion. Diffuse pulmonary interstitial edema. Multifocal pulmonary consolidations with mid and lower lung predominance. IMPRESSION: 1. Multifocal airspace consolidations with mid and lower lung predominance highly suspicious for atypical multifocal pneumonia. 2. Diffuse pulmonary interstitial edema. 3. Right-sided chest port tip terminating in the cavoatrial junction. Ct Chest W Cont    Result Date: 7/25/2020  CT OF THE CHEST WITH INTRAVENOUS CONTRAST, 7/25/2020 Indication: Worsening hypoxemia in Covid patient. Comparison: Chest x-ray 7/23/2020 Technique:   2.5 mm axial scans from above the aortic arch to the lung bases following the uneventful administration of 70 mL of Isovue-370. Intravenous contrast was given to evaluate for pulmonary embolism. All CT scans performed at this facility use one or all of the following: Automated exposure control, adjustment of the mA and/or kVp according to patient's size, iterative reconstruction. Findings: The base of the neck is unremarkable in appearance. Prominent likely reactive mediastinal, and hilar lymph nodes are seen. The thoracic aorta is normal in caliber. Opacification of the pulmonary arteries is suboptimal and furthermore limited by patient breathing motion artifact. No defined convincing filling defects are seen to suggest pulmonary embolism.   Evaluation with lung windows demonstrates symmetric bilateral lung infiltrates. When the  image of today's study is compared to the prior chest x-ray, this does not appear significantly changed. Trace likely reactive bilateral pleural effusions are seen. Lungs are expanded without evidence for pneumothorax. Multiple subacute to chronic appearing bilateral rib fractures are seen. .  Limited evaluation of the upper abdomen demonstrates no acute abnormality. IMPRESSION:  1. No evidence for pulmonary embolism. 2. Symmetric bilateral lung infiltrates consistent with reported history of colon 19 infection. Likely reactive lymph nodes and trace bilateral pleural effusions are seen as described above. No results found for this visit on 07/23/20. Cultures:   All Micro Results     Procedure Component Value Units Date/Time    CULTURE, BLOOD [[id number]] Collected:  07/23/20 2052    Order Status:  Completed Specimen:  Blood Updated:  07/28/20 0859     Special Requests: --        NO SPECIAL REQUESTS  LATERAL PORT       Culture result: NO GROWTH 5 DAYS       CULTURE, BLOOD [[id number]] Collected:  07/23/20 2015    Order Status:  Canceled Specimen:  Blood           Assessment/Plan:     Principal Problem:    Acute respiratory failure with hypoxia ([facility] [address] (7/23/2020)  - Slowly improving  - Will attempt to wean to room air today  - Continue Decadron  - S/P Convalescent plasma  - Not a candidate for Remdesivir  - Continue Vitamin C + Zinc  - Continue Vitamin D  - Continue Lovenox  - Wean oxygen as appropriate    Active Problems:    COVID-19 virus infection (7/23/2020)  - Slowly improving  - Continue Decadron  - S/P Convalescent plasma  - Not a candidate for Remdesivir  - Continue Vitamin C + Zinc  - Continue Vitamin D  - Continue Lovenox      Pulmonary infiltrates on CXR (7/23/2020)  - Due to COVID  - Change Ceftriaxone to Vantin  - Continue PO Doxy  - WBC stable  - Procal 0.94      Headache (7/23/2020)  - Likely due to viral illness  - Continue to monitor      MDS (myelodysplastic syndrome) (HCC) ()  - Blood counts dropping, but not to transfusion levels  - Check CBC daily      Multiple myeloma ([facility] [address] (7/1/2016)  - Blood counts dropping, but not to transfusion levels  - Check CBC daily      Obesity (11/29/2017)      Suspected sleep apnea (7/28/2020)    Today's Plan: Continue Decadron.     DIET REGULAR    DVT Prophylaxis: Lovenox    Discharge Plan: Home in 1-2 days      Signed By: Tara Ventura DO     July 29, 2020

## 2020-07-29 NOTE — PROGRESS NOTES
Pt is resting in bed at this time. Pt is on RA. Pt has no complaints of pain at this time. Pt has no s/sx of distress at this time. Pt is alert and oriented times 4. Safety measures in place. Pt has call light within reach and is encouraged to call for assistance if needed. Will continue to monitor.

## 2020-07-29 NOTE — PROGRESS NOTES
Pt resting in bed at this time. Pt on 3LNC. Alert and oriented times 3. No s/sx of distress noted. Pt denies any pain. Encouraged to call for assistance as needed. Call light within reach. Will monitor.

## 2020-07-29 NOTE — PROGRESS NOTES
Problem: Falls - Risk of  Goal: *Absence of Falls  Description: Document Rukhsana Johnson Fall Risk and appropriate interventions in the flowsheet.   Outcome: Progressing Towards Goal  Note: Fall Risk Interventions:            Medication Interventions: Patient to call before getting OOB                   Problem: Patient Education: Go to Patient Education Activity  Goal: Patient/Family Education  Outcome: Progressing Towards Goal

## 2020-07-29 NOTE — PROGRESS NOTES
Pt resting in bed watching tv. Pt on RA. No s/sx of distress noted. Denies pain. Call light within reach. Will monitor.

## 2020-07-29 NOTE — PROGRESS NOTES
Checked pt O2 sat on RA. Pt returning to bed from bathroom. RA sat 89 with quick recovery to 91 when sitting on side of bed. Will continue to monitor.

## 2020-07-29 NOTE — PROGRESS NOTES
Chart screened  By case management. Pt O2 needs are currently at 6L pt will need to have O2 demands 5 or less to go home with O2  Per DME concentrator  Max is 5L  Please consult  if any new issues arise  Discharge plan is possible HH and Home O2 if needed.

## 2020-07-29 NOTE — PROGRESS NOTES
Pt sitting up in chair. Pt moves around the room well on RA. Sat 94-95% at this time. Pt denies pain or distress at this time. Pt encouraged to call for assistance if needed call light in reach, will monitor.

## 2020-07-29 NOTE — PROGRESS NOTES
Pt is resting in recliner at this time. Pt is on 6L high flow NC. Pt is alert and oriented times 4. Pt has received Tramadol for complaint of head pain. Pt has no s/sx of acute distress at this time. Pt has call light within reach and is encouraged to call for assistance if needed. Will prepare report for oncoming nurse.

## 2020-07-30 VITALS
BODY MASS INDEX: 31.47 KG/M2 | TEMPERATURE: 98.1 F | SYSTOLIC BLOOD PRESSURE: 108 MMHG | HEART RATE: 97 BPM | OXYGEN SATURATION: 95 % | RESPIRATION RATE: 20 BRPM | DIASTOLIC BLOOD PRESSURE: 65 MMHG | WEIGHT: 232.37 LBS | HEIGHT: 72 IN

## 2020-07-30 LAB
ALBUMIN SERPL-MCNC: 2.6 G/DL (ref 3.2–4.6)
ALBUMIN/GLOB SERPL: 0.8 {RATIO} (ref 1.2–3.5)
ALP SERPL-CCNC: 97 U/L (ref 50–136)
ALT SERPL-CCNC: 22 U/L (ref 12–65)
ANION GAP SERPL CALC-SCNC: 8 MMOL/L (ref 7–16)
AST SERPL-CCNC: 16 U/L (ref 15–37)
BASOPHILS # BLD: 0 K/UL (ref 0–0.2)
BASOPHILS NFR BLD: 0 % (ref 0–2)
BILIRUB DIRECT SERPL-MCNC: 0.1 MG/DL
BILIRUB SERPL-MCNC: 0.7 MG/DL (ref 0.2–1.1)
BUN SERPL-MCNC: 17 MG/DL (ref 8–23)
CALCIUM SERPL-MCNC: 8.2 MG/DL (ref 8.3–10.4)
CHLORIDE SERPL-SCNC: 110 MMOL/L (ref 98–107)
CO2 SERPL-SCNC: 24 MMOL/L (ref 21–32)
CREAT SERPL-MCNC: 1.12 MG/DL (ref 0.8–1.5)
DIFFERENTIAL METHOD BLD: ABNORMAL
EOSINOPHIL # BLD: 0.2 K/UL (ref 0–0.8)
EOSINOPHIL NFR BLD: 3 % (ref 0.5–7.8)
ERYTHROCYTE [DISTWIDTH] IN BLOOD BY AUTOMATED COUNT: 15.9 % (ref 11.9–14.6)
GLOBULIN SER CALC-MCNC: 3.3 G/DL (ref 2.3–3.5)
GLUCOSE SERPL-MCNC: 88 MG/DL (ref 65–100)
HCT VFR BLD AUTO: 21.8 % (ref 41.1–50.3)
HGB BLD-MCNC: 7.5 G/DL (ref 13.6–17.2)
IMM GRANULOCYTES # BLD AUTO: 0.2 K/UL (ref 0–0.5)
IMM GRANULOCYTES NFR BLD AUTO: 2 % (ref 0–5)
LYMPHOCYTES # BLD: 0.8 K/UL (ref 0.5–4.6)
LYMPHOCYTES NFR BLD: 12 % (ref 13–44)
MAGNESIUM SERPL-MCNC: 1.7 MG/DL (ref 1.8–2.4)
MCH RBC QN AUTO: 33 PG (ref 26.1–32.9)
MCHC RBC AUTO-ENTMCNC: 34.4 G/DL (ref 31.4–35)
MCV RBC AUTO: 96 FL (ref 79.6–97.8)
MONOCYTES # BLD: 0.3 K/UL (ref 0.1–1.3)
MONOCYTES NFR BLD: 4 % (ref 4–12)
NEUTS SEG # BLD: 5.4 K/UL (ref 1.7–8.2)
NEUTS SEG NFR BLD: 79 % (ref 43–78)
NRBC # BLD: 0.02 K/UL (ref 0–0.2)
PLATELET # BLD AUTO: 286 K/UL (ref 150–450)
PMV BLD AUTO: 10.5 FL (ref 9.4–12.3)
POTASSIUM SERPL-SCNC: 3.6 MMOL/L (ref 3.5–5.1)
PROT SERPL-MCNC: 5.9 G/DL (ref 6.3–8.2)
RBC # BLD AUTO: 2.27 M/UL (ref 4.23–5.6)
SODIUM SERPL-SCNC: 142 MMOL/L (ref 136–145)
WBC # BLD AUTO: 6.8 K/UL (ref 4.3–11.1)

## 2020-07-30 PROCEDURE — 94761 N-INVAS EAR/PLS OXIMETRY MLT: CPT

## 2020-07-30 PROCEDURE — 85025 COMPLETE CBC W/AUTO DIFF WBC: CPT

## 2020-07-30 PROCEDURE — 74011250637 HC RX REV CODE- 250/637: Performed by: INTERNAL MEDICINE

## 2020-07-30 PROCEDURE — 74011250636 HC RX REV CODE- 250/636: Performed by: INTERNAL MEDICINE

## 2020-07-30 PROCEDURE — 80076 HEPATIC FUNCTION PANEL: CPT

## 2020-07-30 PROCEDURE — 80048 BASIC METABOLIC PNL TOTAL CA: CPT

## 2020-07-30 PROCEDURE — 74011250637 HC RX REV CODE- 250/637: Performed by: HOSPITALIST

## 2020-07-30 PROCEDURE — 74011250636 HC RX REV CODE- 250/636: Performed by: HOSPITALIST

## 2020-07-30 PROCEDURE — 83735 ASSAY OF MAGNESIUM: CPT

## 2020-07-30 RX ORDER — HEPARIN 100 UNIT/ML
300 SYRINGE INTRAVENOUS AS NEEDED
Status: DISCONTINUED | OUTPATIENT
Start: 2020-07-30 | End: 2020-07-30 | Stop reason: HOSPADM

## 2020-07-30 RX ORDER — CEFPODOXIME PROXETIL 200 MG/1
200 TABLET, FILM COATED ORAL EVERY 12 HOURS
Qty: 6 TAB | Refills: 0 | Status: SHIPPED | OUTPATIENT
Start: 2020-07-30 | End: 2020-08-02

## 2020-07-30 RX ORDER — DOXYCYCLINE 100 MG/1
100 CAPSULE ORAL EVERY 12 HOURS
Qty: 6 CAP | Refills: 0 | Status: SHIPPED | OUTPATIENT
Start: 2020-07-30 | End: 2020-08-02

## 2020-07-30 RX ADMIN — MULTIPLE VITAMINS W/ MINERALS TAB 1 TABLET: TAB at 08:43

## 2020-07-30 RX ADMIN — LORATADINE 10 MG: 10 TABLET ORAL at 08:43

## 2020-07-30 RX ADMIN — Medication 500 MG: at 08:43

## 2020-07-30 RX ADMIN — DOXYCYCLINE HYCLATE 100 MG: 100 CAPSULE ORAL at 08:44

## 2020-07-30 RX ADMIN — Medication 220 MG: at 08:44

## 2020-07-30 RX ADMIN — FOLIC ACID 1 MG: 1 TABLET ORAL at 08:43

## 2020-07-30 RX ADMIN — CEFPODOXIME PROXETIL 200 MG: 200 TABLET, FILM COATED ORAL at 08:44

## 2020-07-30 RX ADMIN — GUAIFENESIN 1200 MG: 600 TABLET ORAL at 08:44

## 2020-07-30 RX ADMIN — TRAMADOL HYDROCHLORIDE 50 MG: 50 TABLET ORAL at 05:51

## 2020-07-30 RX ADMIN — MELATONIN 1 TABLET: at 09:00

## 2020-07-30 RX ADMIN — HEPARIN SODIUM (PORCINE) LOCK FLUSH IV SOLN 100 UNIT/ML 300 UNITS: 100 SOLUTION at 10:49

## 2020-07-30 RX ADMIN — PANTOPRAZOLE SODIUM 40 MG: 40 TABLET, DELAYED RELEASE ORAL at 06:35

## 2020-07-30 RX ADMIN — FLUTICASONE PROPIONATE 2 SPRAY: 50 SPRAY, METERED NASAL at 09:00

## 2020-07-30 RX ADMIN — ENOXAPARIN SODIUM 40 MG: 40 INJECTION SUBCUTANEOUS at 08:42

## 2020-07-30 RX ADMIN — Medication 10 ML: at 10:49

## 2020-07-30 RX ADMIN — BUDESONIDE 9 MG: 3 CAPSULE, GELATIN COATED ORAL at 08:43

## 2020-07-30 RX ADMIN — DEXAMETHASONE SODIUM PHOSPHATE 6 MG: 10 INJECTION INTRAMUSCULAR; INTRAVENOUS at 08:44

## 2020-07-30 RX ADMIN — Medication 10 ML: at 06:35

## 2020-07-30 NOTE — DISCHARGE INSTRUCTIONS
Advance Care Planning  People with COVID-19 may have no symptoms, mild symptoms, such as fever, cough, and shortness of breath or they may have more severe illness, developing severe and fatal pneumonia. As a result, Advance Care Planning with attention to naming a health care decision maker (someone you trust to make healthcare decisions for you if you could not speak for yourself) and sharing other health care preferences is important BEFORE a possible health crisis. Please contact your Primary Care Provider to discuss Advance Care Planning. Preventing the Spread of Coronavirus Disease 2019 in Homes and Residential Communities  For the most recent information go to [email or web address]    Prevention steps for People with confirmed or suspected COVID-19 (including persons under investigation) who do not need to be hospitalized  and   People with confirmed COVID-19 who were hospitalized and determined to be medically stable to go home    Your healthcare provider and public health staff will evaluate whether you can be cared for at home. If it is determined that you do not need to be hospitalized and can be isolated at home, you will be monitored by staff from your local or state health department. You should follow the prevention steps below until a healthcare provider or local or state health department says you can return to your normal activities. Stay home except to get medical care  People who are mildly ill with COVID-19 are able to isolate at home during their illness. You should restrict activities outside your home, except for getting medical care. Do not go to work, school, or public areas. Avoid using public transportation, ride-sharing, or taxis. Separate yourself from other people and animals in your home  People: As much as possible, you should stay in a specific room and away from other people in your home.  Also, you should use a separate bathroom, if available. Animals: You should restrict contact with pets and other animals while you are sick with COVID-19, just like you would around other people. Although there have not been reports of pets or other animals becoming sick with COVID-19, it is still recommended that people sick with COVID-19 limit contact with animals until more information is known about the virus. When possible, have another member of your household care for your animals while you are sick. If you are sick with COVID-19, avoid contact with your pet, including petting, snuggling, being kissed or licked, and sharing food. If you must care for your pet or be around animals while you are sick, wash your hands before and after you interact with pets and wear a facemask. Call ahead before visiting your doctor  If you have a medical appointment, call the healthcare provider and tell them that you have or may have COVID-19. This will help the healthcare providers office take steps to keep other people from getting infected or exposed. Wear a facemask  You should wear a facemask when you are around other people (e.g., sharing a room or vehicle) or pets and before you enter a healthcare providers office. If you are not able to wear a facemask (for example, because it causes trouble breathing), then people who live with you should not stay in the same room with you, or they should wear a facemask if they enter your room. Cover your coughs and sneezes  Cover your mouth and nose with a tissue when you cough or sneeze. Throw used tissues in a lined trash can. Immediately wash your hands with soap and water for at least 20 seconds or, if soap and water are not available, clean your hands with an alcohol-based hand  that contains at least 60% alcohol.   Clean your hands often  Wash your hands often with soap and water for at least 20 seconds, especially after blowing your nose, coughing, or sneezing; going to the bathroom; and before eating or preparing food. If soap and water are not readily available, use an alcohol-based hand  with at least 60% alcohol, covering all surfaces of your hands and rubbing them together until they feel dry. Soap and water are the best option if hands are visibly dirty. Avoid touching your eyes, nose, and mouth with unwashed hands. Avoid sharing personal household items  You should not share dishes, drinking glasses, cups, eating utensils, towels, or bedding with other people or pets in your home. After using these items, they should be washed thoroughly with soap and water. Clean all high-touch surfaces everyday  High touch surfaces include counters, tabletops, doorknobs, bathroom fixtures, toilets, phones, keyboards, tablets, and bedside tables. Also, clean any surfaces that may have blood, stool, or body fluids on them. Use a household cleaning spray or wipe, according to the label instructions. Labels contain instructions for safe and effective use of the cleaning product including precautions you should take when applying the product, such as wearing gloves and making sure you have good ventilation during use of the product. Monitor your symptoms  Seek prompt medical attention if your illness is worsening (e.g., difficulty breathing). Before seeking care, call your healthcare provider and tell them that you have, or are being evaluated for, COVID-19. Put on a facemask before you enter the facility. These steps will help the healthcare providers office to keep other people in the office or waiting room from getting infected or exposed. Ask your healthcare provider to call the local or state health department. Persons who are placed under active monitoring or facilitated self-monitoring should follow instructions provided by their local health department or occupational health professionals, as appropriate. When working with your local health department check their available hours.   If you have a medical emergency and need to call 911, notify the dispatch personnel that you have, or are being evaluated for COVID-19. If possible, put on a facemask before emergency medical services arrive. Discontinuing home isolation  Patients with confirmed COVID-19 should remain under home isolation precautions until the risk of secondary transmission to others is thought to be low. The decision to discontinue home isolation precautions should be made on a case-by-case basis, in consultation with healthcare providers and state and local health departments. Patient Education        Coronavirus ([id number]): Care Instructions  Overview  The coronavirus disease (COVID-19) is caused by a virus. Symptoms may include a fever, a cough, and shortness of breath. It mainly spreads person-to-person through droplets from coughing and sneezing. The virus also can spread when people are in close contact with someone who is infected. Most people have mild symptoms and can take care of themselves at home. If their symptoms get worse, they may need care in a hospital. There is no medicine to fight the virus. It's important to not spread the virus to others. If you have COVID-19, wear a face cover anytime you are around other people. You need to isolate yourself while you are sick. Your doctor or local public health official will tell you when you no longer need to be isolated. Leave your home only if you need to get medical care. Follow-up care is a key part of your treatment and safety. Be sure to make and go to all appointments, and call your doctor if you are having problems. It's also a good idea to know your test results and keep a list of the medicines you take. How can you care for yourself at home? · Get extra rest. It can help you feel better. · Drink plenty of fluids. This helps replace fluids lost from fever. Fluids also help ease a scratchy throat.  Water, soup, fruit juice, and hot tea with lemon are good choices. · Take acetaminophen (such as Tylenol) to reduce a fever. It may also help with muscle aches. Read and follow all instructions on the label. · Sponge your body with lukewarm water to help with fever. Don't use cold water or ice. · Use petroleum jelly on sore skin. This can help if the skin around your nose and lips becomes sore from rubbing a lot with tissues. Tips for isolation  · Wear a cloth face cover when you are around other people. It can help stop the spread of the virus when you cough or sneeze. · Limit contact with people in your home. If possible, stay in a separate bedroom and use a separate bathroom. · If you have to leave home, avoid crowds and try to stay at least 6 feet away from other people. · Avoid contact with pets and other animals. · Cover your mouth and nose with a tissue when you cough or sneeze. Then throw it in the trash right away. · Wash your hands often, especially after you cough or sneeze. Use soap and water, and scrub for at least 20 seconds. If soap and water aren't available, use an alcohol-based hand . · Don't share personal household items. These include bedding, towels, cups and glasses, and eating utensils. · [address] in the warmest water allowed for the fabric type, and dry it completely. It's okay to wash other people's laundry with yours. · Clean and disinfect your home every day. Use household  and disinfectant wipes or sprays. Take special care to clean things that you grab with your hands. These include doorknobs, remote controls, phones, and handles on your refrigerator and microwave. And don't forget countertops, tabletops, bathrooms, and computer keyboards. When should you call for help? [id number] anytime you think you may need emergency care. For example, call if you have life-threatening symptoms, such as:  · You have severe trouble breathing. (You can't talk at all.)  · You have constant chest pain or pressure.   · You are severely dizzy or lightheaded. · You are confused or can't think clearly. · Your face and lips have a blue color. · You pass out (lose consciousness) or are very hard to wake up. Call your doctor now or seek immediate medical care if:  · You have moderate trouble breathing. (You can't speak a full sentence.)  · You are coughing up blood (more than about 1 teaspoon). · You have signs of low blood pressure. These include feeling lightheaded; being too weak to stand; and having cold, pale, clammy skin. Watch closely for changes in your health, and be sure to contact your doctor if:  · Your symptoms get worse. · You are not getting better as expected. Call before you go to the doctor's office. Follow their instructions. And wear a cloth face cover. Current as of: May 8, 2020               Content Version: 12.5  © 2006-2020 [employer]. Care instructions adapted under license by [employer] (which disclaims liability or warranty for this information). If you have questions about a medical condition or this instruction, always ask your healthcare professional. [address] any warranty or liability for your use of this information.

## 2020-07-30 NOTE — PROGRESS NOTES
Discharge paperwork and instructions reviewed with pt. Meds reviewed. Pt voices understanding. Removed access from right chest port. Prescriptions sent to pharmacy. No signature obtained due to positive COVID test. Pt to be discharged home when ride arrives.

## 2020-07-30 NOTE — DISCHARGE SUMMARY
Hospitalist Discharge Summary     Patient ID:  Ollie Rodriguez  [id number]  14 y.o.  1960  Admit date: 7/23/2020  7:37 PM  Discharge date and time: 7/30/2020  Attending: Joseph Sánchez DO  PCP:  Lorraine Figueroa MD  Treatment Team: Attending Provider: Joseph Sánchez DO; Care Manager: Sabi William Primary Nurse: Ina Dailey; Consulting Provider: Blayne Joseph MD; Utilization Review: Khris Boland RN; Primary Nurse: Faina Martinez RN; Primary Nurse: Philip Gao RN    Principal Diagnosis Acute respiratory failure with hypoxia [facility])    Hospital Problems as of 7/30/2020 Date Reviewed: 7/20/2020          Codes Class Noted - Resolved POA    * (Principal) Acute respiratory failure with hypoxia ([facility] ICD-10-CM: J96.01  ICD-9-CM: 518.81  7/23/2020 - Present Yes        COVID-19 virus infection ICD-10-CM: U07.1  ICD-9-CM: 079.89  7/23/2020 - Present Yes        Pulmonary infiltrates on CXR ICD-10-CM: R91.8  ICD-9-CM: 793.19  7/23/2020 - Present Yes        Headache ICD-10-CM: R51  ICD-9-CM: 784.0  7/23/2020 - Present Yes        Multiple myeloma ([facility] ICD-10-CM: C90.00  ICD-9-CM: 203.00  7/1/2016 - Present Yes        MDS (myelodysplastic syndrome) ([facility] ICD-10-CM: D46.9  ICD-9-CM: 238.75  Unknown - Present Yes        Suspected sleep apnea ICD-10-CM: R29.818  ICD-9-CM: 781.99  7/28/2020 - Present Yes        Obesity ICD-10-CM: E66.9  ICD-9-CM: 278.00  11/29/2017 - Present Yes              Hospital Course:  Please refer to the admission H&P for details of presentation. In summary, the patient is a 56year old CM with PMH of MDS, multiple myeloma, anemia, HLD admitting on 7/23/20 with worsening shortness of breath, hypoxia (RA 85%), tachypnea, worsening severe headache, loss of taste and smell admitted on 7/23/20. He was tested for Covid-19 on 7/16 and was positive. His initial symptoms actually started at least 2 weeks prior. Thinks he got it from his wife who works at [employer]. He receives chronic blood transfusions for anemia. In ED he had tachypnea and diffuse rales. His chest xray shows diffuse infiltrates. Was started on IV decadron as well as rocephin/azithromycin. He was admitted to the floor. He got convalescent plasma. He was not a candidate for Remdesivir. He was slowly wean off of oxygen and felt much improved. He was discharged home with Vantin + Doxy & 30 days of Xarelto. Significant Diagnostic Studies:    Labs: Results:       Chemistry Recent Labs     07/30/20  0606 07/29/20  0523 07/28/20 0411   GLU 88 91 118*    140 139   K 3.6 3.8 3.6   * 110* 109*   CO2 24 24 24   BUN 17 18 18   CREA 1.12 0.93 0.92   CA 8.2* 8.4 8.4   AGAP 8 6* 6*   AP 97 96 106   TP 5.9* 6.0* 6.0*   ALB 2.6* 2.6* 2.7*   GLOB 3.3 3.4 3.3   AGRAT 0.8* 0.8* 0.8*      CBC w/Diff Recent Labs     07/30/20  0606 07/29/20 0523 07/28/20 0411   WBC 6.8 6.8 6.7   RBC 2.27* 2.32* 2.37*   HGB 7.5* 7.3* 7.4*   HCT 21.8* 22.5* 23.0*    282 243   GRANS 79* 78 82*   LYMPH 12* 12* 9*   EOS 3 3 3      Cardiac Enzymes No results for input(s): CPK, CKND1, TOSHIA in the last 72 hours. No lab exists for component: CKRMB, TROIP   Coagulation No results for input(s): PTP, INR, APTT, INREXT, INREXT in the last 72 hours. Lipid Panel Lab Results   Component Value Date/Time    Cholesterol, total 127 12/03/2018 08:49 AM    HDL Cholesterol 44 12/03/2018 08:49 AM    LDL, calculated 69 12/03/2018 08:49 AM    VLDL, calculated 14 12/03/2018 08:49 AM    Triglyceride 68 12/03/2018 08:49 AM      BNP No results for input(s): BNPP in the last 72 hours. Liver Enzymes Recent Labs     07/30/20  0606   TP 5.9*   ALB 2.6*   AP 97      Thyroid Studies No results found for: T4, T3U, TSH, TSHEXT, TSHEXT         Imaging:  Xr Chest Sngl V    Result Date: 7/23/2020  IMPRESSION: 1. Multifocal airspace consolidations with mid and lower lung predominance highly suspicious for atypical multifocal pneumonia.  2. Diffuse pulmonary interstitial edema. 3. Right-sided chest port tip terminating in the cavoatrial junction. Ct Chest W Cont    Result Date: 7/25/2020  IMPRESSION:  1. No evidence for pulmonary embolism. 2. Symmetric bilateral lung infiltrates consistent with reported history of colon 19 infection. Likely reactive lymph nodes and trace bilateral pleural effusions are seen as described above. Microbiology/Cultures: All Micro Results     Procedure Component Value Units Date/Time    CULTURE, BLOOD [[id number]] Collected:  07/23/20 2052    Order Status:  Completed Specimen:  Blood Updated:  07/28/20 0859     Special Requests: --        NO SPECIAL REQUESTS  LATERAL PORT       Culture result: NO GROWTH 5 DAYS       CULTURE, BLOOD [[id number]] Collected:  07/23/20 2015    Order Status:  Canceled Specimen:  Blood           Discharge Exam:  Visit Vitals  /65 (BP 1 Location: Left arm, BP Patient Position: Sitting)   Pulse 97   Temp 98.1 °F (36.7 °C)   Resp 20   Ht 6' (1.829 m)   Wt 105.4 kg (232 lb 5.8 oz)   SpO2 95%   BMI 31.51 kg/m²     General appearance: alert, cooperative, no distress, appears stated age  Lungs: clear to auscultation bilaterally  Heart: regular rate and rhythm, S1, S2 normal, no murmur, click, rub or gallop  Abdomen: soft, non-tender. Bowel sounds normal. No masses,  no organomegaly  Extremities: no cyanosis or edema  Neurologic: Grossly normal    Disposition: home  Discharge Condition: stable  Patient Instructions:   Current Discharge Medication List      START taking these medications    Details   cefpodoxime (VANTIN) 200 mg tablet Take 1 Tab by mouth every twelve (12) hours for 6 doses. Qty: 6 Tab, Refills: 0      doxycycline (VIBRAMYCIN) 100 mg capsule Take 1 Cap by mouth every twelve (12) hours for 6 doses. Qty: 6 Cap, Refills: 0      rivaroxaban (Xarelto) 10 mg tablet Take 1 Tab by mouth daily (with breakfast).   Qty: 30 Tab, Refills: 0         CONTINUE these medications which have NOT CHANGED Details   omeprazole (PRILOSEC) 40 mg capsule Take 40 mg by mouth daily. BUDESONIDE PO Take 9 mg by mouth daily. colestipoL (COLESTID) 1 gram tablet Take 2 g by mouth two (2) times a day. darbepoetin steven in polysorbat (ARANESP, POLYSORBATE,) 200 mcg/mL Injection 1 mL by SubCUTAneous route Once every 2 weeks. Qty: 1 mL, Refills: 0      fluticasone propionate (FLONASE) 50 mcg/actuation nasal spray SHAKE LIQUID AND USE 2 SPRAYS IN EACH NOSTRIL EVERY NIGHT  Qty: 3 Bottle, Refills: 3    Comments: **Patient requests 90 days supply**      azelastine (ASTELIN) 137 mcg (0.1 %) nasal spray USE 2 SPRAYS IN EACH NOSTRIL TWICE DAILY  Qty: 1 Bottle, Refills: 11      FOLIC ACID/MULTIVIT-MIN/LUTEIN (CENTRUM SILVER PO) Take 1 Tab by mouth daily. naproxen sodium (ALEVE) 220 mg tablet Take 440 mg by mouth two (2) times daily as needed. Qty: 28 Tab, Refills: 0      LORazepam (ATIVAN) 0.5 mg tablet Take 0.5 mg by mouth nightly. folic acid (FOLVITE) 1 mg tablet Take 1 mg by mouth. 1 QD      ibuprofen (MOTRIN) 200 mg tablet Take 200 mg by mouth three (3) times daily as needed for Pain. cetirizine (ZYRTEC) 10 mg tablet Take 10 mg by mouth nightly.              Activity: Activity as tolerated  Diet: Regular Diet  Wound Care: None needed    Follow-up  ·   PCP in one week  · Dr. Luis Enrique Mari (hematology) at scheduled appt  Time spent to discharge patient 35 minutes  Signed:  Juan Goldberg DO  7/30/2020  9:29 AM

## 2020-07-30 NOTE — PROGRESS NOTES
Pt sitting up in chair. Alert and oriented times 3. On RA. No s/sx of distress noted. Pt reported that he still had some pain from a headache earlier but is getting better with the pain medicine given earlier. Encouraged to call for assistance as needed. Call light within reach. Will monitor.

## 2020-07-30 NOTE — PROGRESS NOTES
Pt taken to front of hospital via [facility] for [facility] home with wife.

## 2020-07-30 NOTE — PROGRESS NOTES
Respiratory Care Services     Policy Number: -[id number]    Title: Home Oxygen Assessment Protocol    Effective Date:  4/9/14    Reviewed Date: 5/28/2018    Revised: 07/2019       I. Policy: The Respiratory Care Practitioner (RCP) shall assess all patients who meet Medicare's Home Oxygen Diagnostic Requirements. If a patient is unable to wean to room air within 48 hours of discharge, the RCP shall order a 3 step ambulatory oxygen saturation (SpO2) per protocol and instruct the patient in completing the test. The RCP shall document results of the test as outlined in this protocol. II. Purpose: Oxygen is used for short-term hospitalized patients and long-term therapy in patients with chronic lung disease and recurring congestive heart failure. Centers for [facility] [employer] (CMS) has very specific guidelines and indications for its short-term use in the acute care setting and the long-term use in the home or other facility. This protocol will address the rationale and requirements for long-term oxygen therapy. Long-term oxygen therapy is delivered to reduce long-term complications of chronic hypoxemia, particularly cor pulmonale. Oxygen supplementation in patients with chronic hypoxemia has been shown to improve survival, pulmonary hemodynamics, exercise capacity and neuropsychological performance. 1    III. Responsibility: Director of [facility] and Respiratory Care Practitioners. IV. Home Oxygen Diagnostic Requirements:   A. Covered health conditions:  1. Severe primary lung disease  a. Chronic obstructive pulmonary disease  b. Diffuse interstitial lung disease  c. Cystic fibrosis  2. Bronchiectasis  3. Pulmonary neoplasm, primary or metastatic  4. Chronic bronchitis  5. Emphysema  6. Hypoxia-related symptoms or conditions that may improve with oxygen therapy such as  a. Pulmonary hypertension  b.  Recurring congestive heart failure due to chronic cor pulmonale  c. Erythocytosis/erythrocythemia  B. Qualifying testing requirements  1. Testing must be performed within two days prior to discharge. 2. Test results must be documented in patient's medical record in approved format. C. Testing can be done under three conditions  1. A test during rest.  a. Oxygen is considered medically necessary if SpO2 is less than or equal to 88%. b. If SpO2 is greater than 88%, proceed to step 2.  2. A test taken on room air during exercise  a. If patient meets qualifying threshold of SpO2 less than or equal to 88% while exercising, all three of the required tests below must be performed within same testing session and be recorded in the patient's medical record. i. A test taken during rest while patient breathes room air. ii. A test during exercise while patient breathes room air.  iii. A test taken during exercise with oxygen applied. (to demonstrate improvement of hypoxia). 3. A test taken during sleep.  a. If patient is tested during sleep, test must have at least two hours of recorded time. b. Test must indicate arterial oxygen saturation of 88% or less for at least 5 minutes of   testing period. c. A patient tested during sleep will not qualify for portable oxygen. d. A physician order will be required for an overnight oximetry test.  D. Regardless of test condition, the following values apply to all:  1. Group I: (Requires annual recertification)  a. Patient is on room air while at rest (awake) when tested. b.  Arterial oxygen saturation (SaO2) is at or below 88% or  c. PaO2 is at or below <55mm Hg   2. Group II: (Recertification and retesting are required 61-90 days after initial start)  a. Patient is on room air at rest while awake when tested. b. PaO2 = 5659 mmHg or  c.  SaO2 89% acceptable only with secondary diagnosis of  - Edema suggesting congestive heart failure  - Pulmonary hypertension/cor pulmonale with P wave > 3mm in lead II, III, or AVF  - Erythrocythemia with Hct >56%  3. Group III:   If PaO2 > 60 mmHg or   - SaO2 >90% there is a presumption of noncoverage. 4. If liter flow is greater than 4LPM, patient must meet Group 1 or Group II criteria while patient is receiving oxygen at a rate of 4LPM or higher  5. All patients must be tested in a stable state including those discharged from the hospital.  6. All coexisting diseases or conditions that can cause hypoxia must be treated and patient must be tested in a chronic stable state before oxygen therapy is qualified. V. Procedure for SpO2 testing at rest.  A. Obtain a 30 second room air SpO2 check while patient is on room air, rested and awake. 1. If SpO2 is 88% increase O2 by 1 L to maintain SpO2 of 90%. 2. Document results in patient's EMR.    VI. Procedure for ambulatory SpO2 testing  A. For patient who meets the Covered Health Conditions and is unable to wean to room air within 48 hours of discharge, complete an ambulatory SaO2. B. Preparation  1. Write an order for 3 step ambulatory oxygen saturation test per protocol. 2. Obtain pulse oximeter and insure that it is working accurately. 3. Perform hand hygiene per hospital policy utilizing Standard Precautions for all patients and following transmission-based isolation as indicated per hospital policy. 4. Identify patient, verify name and birth date via ID bracelet. 5. Introduce self and identify department. 6. Explain procedure to patient and family and confirm understanding. C. Assessment  1. Assure that patient is on room air prior to test.  2. Fingernail polish if worn by patient must be removed before testing. 3. Ask patient to sit in an upright position. 4. Resting SaO2 assessment  a. Perform a 30 second resting room air SaO2.  b.  If SaO2 is 88% or less Medicare considers oxygen is medically necessary  c. Document findings in patient EMR. d.  If SaO2 is greater than 88%, proceed to the next step.   5. Ambulatory Room Air SaO2 check  a. Ask patient to walk for 5 minutes on room air or as long as tolerated. b. If patients ordinarily use a cane, walker or rollator, they should be used during test.  c. Instruct patient to walk at a comfortable pace and to breathe naturally during test.  d. Monitor and record patient's heart rate, SaO2 and exercise endurance.  e. If SaO2 is 88% or less post exercise, an ambulatory test on oxygen must be performed. 6. Ambulatory SaO2 on Oxygen  a. The P shall place the patient on oxygen to achieve a SaO2 of 90%. b. Lu Grates patient to walk for 5 minutes or as long as tolerated. c. If patients ordinarily use a cane, walker or rollator, they should be used during test  d. Monitor and record patient's heart rate, SaO2 and exercise endurance.  e. If Sa02 decreases to the range of 80% to 84% the flow shall be increased by 2LPM.   f. If SaO2 decreases to the range of 85%-89% increased oxygen by 1 LPM.  g. If SaO2 is less than 80%, the patient is not appropriate for ambulation. VII. Documentation  A. Medicare has specific guidelines for documentation of ambulatory oxygen saturation testing, therefore all test results must be documented in the patient's EMR as outlined below. Room air: SpO2 with O2 and liter flow   Resting SpO2 94% ---% on ---L   Ambulating SpO2 93% RESULTS:                                  Patient did not require supplemental O2 to maintain a SAT or above 90% SpO2    VIII. Reference:       Antoine Campos [address] for [facility] & Medicaid Services. Home Oxygen Therapy.  Medicare        Part B- Oxygen Coverage

## 2020-07-30 NOTE — PROGRESS NOTES
Pt is up in recliner at this time. Pt is alert and oriented times 4. Pt is on RA. Pt has no complaints of pain at this time. Pt has no s/sx of distress at this time. Pt has call light within reach and is encouraged to call for assistance if needed. Report given to Constanza Schultz.

## 2020-08-03 ENCOUNTER — PATIENT OUTREACH (OUTPATIENT)
Dept: CASE MANAGEMENT | Age: 60
End: 2020-08-03

## 2020-08-04 NOTE — PROGRESS NOTES
Patient contacted regarding recent visit for viral symptoms. This Stephanie Donaldson contacted the patient by telephone to perform post discharge call. Verified name and  with patient as identifiers. Provided introduction to self, and reason for call due to viral symptoms of infection and/or exposure to COVID-19. Discussed COVID-19 related testing which was done previously at this time. Test results were positive. Patient informed of results, if available? Done Previously    Advance Care Planning:   Does patient have an Advance Directive: Reviewed and current      Patient presented to emergency department/flu clinic with complaints of viral symptoms/exposure to COVID. Patient reports symptoms are unchanged. Due to no new or worsening symptos the RN CTN/LACY was not notified for escalation. Discussed exposure protocols and quarantine with CDC Guidelines What To Do If You Are Sick    patient was given an opportunity for questions and concerns. Stay home except to get medical care  Separate yourself from other people and animals in your home  Call ahead before visiting your doctor  Wear a facemask  Cover your coughs and sneezes  Clean your hands often  Avoid sharing personal household items  Clean all high-touch surfaces everyday    Monitor your symptoms  Seek prompt medical attention if your illness is worsening (e.g., difficulty breathing). Before seeking care, call your healthcare provider and tell them that you have, or are being evaluated for, COVID-19. Put on a facemask before you enter the facility. These steps will help the healthcare provider's office to keep other people in the office or waiting room from getting infected or exposed. Ask your healthcare provider to call the local or state health department. Persons who are placed under If you have a medical emergency and need to call 911, notify the dispatch personnel that you have, or are being evaluated for COVID-19.  If possible, put on a facemask before emergency medical services arrive. The patient agrees to contact the [facility] exposure line [phone], local health department [facility] and PCP office for questions related to their healthcare. Author provided contact information for future reference. Patient/family/caregiver given information for [employer] and agrees to enroll YES  Patient preferred e-mail: [email or web address]  Patient preferred phone contact: [phone]  Based on Loop alert triggers, patient will be contacted by nurse care manager for worsening symptoms.

## 2020-12-07 PROBLEM — U07.1 COVID-19 VIRUS INFECTION: Status: RESOLVED | Noted: 2020-07-23 | Resolved: 2020-12-07

## 2020-12-07 PROBLEM — J96.01 ACUTE RESPIRATORY FAILURE WITH HYPOXIA (HCC): Status: RESOLVED | Noted: 2020-07-23 | Resolved: 2020-12-07

## 2022-02-16 NOTE — PROGRESS NOTES
This RN called non-invasive spoke to Tyner. Tyner states if patient is tested positive for covid the echo will not be preformed unless cardiologist approves. Dr. Pancho Zimmerman aware. Quality 130: Documentation Of Current Medications In The Medical Record: Current Medications Documented Detail Level: Generalized Quality 431: Preventive Care And Screening: Unhealthy Alcohol Use - Screening: Patient not identified as an unhealthy alcohol user when screened for unhealthy alcohol use using a systematic screening method Quality 226: Preventive Care And Screening: Tobacco Use: Screening And Cessation Intervention: Patient screened for tobacco use and is an ex/non-smoker

## 2022-03-18 PROBLEM — R91.8 PULMONARY INFILTRATES ON CXR: Status: ACTIVE | Noted: 2020-07-23

## 2022-03-18 PROBLEM — M62.08 DIASTASIS RECTI: Status: ACTIVE | Noted: 2018-04-16

## 2022-03-19 PROBLEM — R39.9 LOWER URINARY TRACT SYMPTOMS: Status: ACTIVE | Noted: 2018-04-04

## 2022-03-19 PROBLEM — Z87.19 HISTORY OF COLONIC DIVERTICULITIS: Status: ACTIVE | Noted: 2019-06-13

## 2022-03-19 PROBLEM — R29.818 SUSPECTED SLEEP APNEA: Status: ACTIVE | Noted: 2020-07-28

## 2022-03-19 PROBLEM — G56.31: Status: ACTIVE | Noted: 2017-11-29

## 2022-03-19 PROBLEM — M25.519 SHOULDER PAIN: Status: ACTIVE | Noted: 2017-11-29

## 2022-03-19 PROBLEM — R51.9 HEADACHE: Status: ACTIVE | Noted: 2020-07-23

## 2022-03-20 PROBLEM — E66.9 OBESITY: Status: ACTIVE | Noted: 2017-11-29

## 2022-12-12 ENCOUNTER — OFFICE VISIT (OUTPATIENT)
Dept: INTERNAL MEDICINE CLINIC | Facility: CLINIC | Age: 62
End: 2022-12-12

## 2022-12-12 VITALS
BODY MASS INDEX: 31.86 KG/M2 | HEIGHT: 72 IN | HEART RATE: 87 BPM | DIASTOLIC BLOOD PRESSURE: 83 MMHG | RESPIRATION RATE: 18 BRPM | TEMPERATURE: 97.2 F | SYSTOLIC BLOOD PRESSURE: 128 MMHG | WEIGHT: 235.2 LBS

## 2022-12-12 DIAGNOSIS — C90.00 MULTIPLE MYELOMA, REMISSION STATUS UNSPECIFIED (HCC): ICD-10-CM

## 2022-12-12 DIAGNOSIS — E78.49 OTHER HYPERLIPIDEMIA: ICD-10-CM

## 2022-12-12 DIAGNOSIS — K57.90 DIVERTICULOSIS: ICD-10-CM

## 2022-12-12 DIAGNOSIS — T45.1X5A PERIPHERAL NEUROPATHY DUE TO CHEMOTHERAPY (HCC): Primary | ICD-10-CM

## 2022-12-12 DIAGNOSIS — N40.0 BENIGN PROSTATIC HYPERPLASIA WITHOUT LOWER URINARY TRACT SYMPTOMS: ICD-10-CM

## 2022-12-12 DIAGNOSIS — G62.0 PERIPHERAL NEUROPATHY DUE TO CHEMOTHERAPY (HCC): Primary | ICD-10-CM

## 2022-12-12 DIAGNOSIS — F32.A DEPRESSIVE DISORDER: ICD-10-CM

## 2022-12-12 DIAGNOSIS — G89.29 CHRONIC NONINTRACTABLE HEADACHE, UNSPECIFIED HEADACHE TYPE: ICD-10-CM

## 2022-12-12 DIAGNOSIS — R53.82 CHRONIC FATIGUE: ICD-10-CM

## 2022-12-12 DIAGNOSIS — D46.9 MDS (MYELODYSPLASTIC SYNDROME) (HCC): ICD-10-CM

## 2022-12-12 DIAGNOSIS — R51.9 CHRONIC NONINTRACTABLE HEADACHE, UNSPECIFIED HEADACHE TYPE: ICD-10-CM

## 2022-12-12 PROBLEM — G56.31: Status: RESOLVED | Noted: 2017-11-29 | Resolved: 2022-12-12

## 2022-12-12 PROBLEM — R39.9 LOWER URINARY TRACT SYMPTOMS: Status: RESOLVED | Noted: 2018-04-04 | Resolved: 2022-12-12

## 2022-12-12 PROBLEM — R29.818 SUSPECTED SLEEP APNEA: Status: RESOLVED | Noted: 2020-07-28 | Resolved: 2022-12-12

## 2022-12-12 PROBLEM — R91.8 PULMONARY INFILTRATES ON CXR: Status: RESOLVED | Noted: 2020-07-23 | Resolved: 2022-12-12

## 2022-12-12 PROBLEM — M25.519 SHOULDER PAIN: Status: RESOLVED | Noted: 2017-11-29 | Resolved: 2022-12-12

## 2022-12-12 PROBLEM — M62.08 DIASTASIS RECTI: Status: RESOLVED | Noted: 2018-04-16 | Resolved: 2022-12-12

## 2022-12-12 PROBLEM — Z87.19 HISTORY OF COLONIC DIVERTICULITIS: Status: RESOLVED | Noted: 2019-06-13 | Resolved: 2022-12-12

## 2022-12-12 LAB
CHOLEST SERPL-MCNC: 168 MG/DL
HDLC SERPL-MCNC: 38 MG/DL (ref 40–60)
HDLC SERPL: 4.4 {RATIO}
LDLC SERPL CALC-MCNC: 92.4 MG/DL
TRIGL SERPL-MCNC: 188 MG/DL (ref 35–150)
VLDLC SERPL CALC-MCNC: 37.6 MG/DL (ref 6–23)

## 2022-12-12 RX ORDER — AZELASTINE 1 MG/ML
2 SPRAY, METERED NASAL 2 TIMES DAILY
Qty: 3 EACH | Refills: 3 | Status: SHIPPED | OUTPATIENT
Start: 2022-12-12

## 2022-12-12 RX ORDER — DEFERASIROX 360 MG/1
720 TABLET, FILM COATED ORAL DAILY
COMMUNITY
Start: 2022-11-09

## 2022-12-12 SDOH — ECONOMIC STABILITY: FOOD INSECURITY: WITHIN THE PAST 12 MONTHS, THE FOOD YOU BOUGHT JUST DIDN'T LAST AND YOU DIDN'T HAVE MONEY TO GET MORE.: NEVER TRUE

## 2022-12-12 SDOH — ECONOMIC STABILITY: FOOD INSECURITY: WITHIN THE PAST 12 MONTHS, YOU WORRIED THAT YOUR FOOD WOULD RUN OUT BEFORE YOU GOT MONEY TO BUY MORE.: NEVER TRUE

## 2022-12-12 ASSESSMENT — SOCIAL DETERMINANTS OF HEALTH (SDOH): HOW HARD IS IT FOR YOU TO PAY FOR THE VERY BASICS LIKE FOOD, HOUSING, MEDICAL CARE, AND HEATING?: NOT HARD AT ALL

## 2022-12-12 NOTE — PROGRESS NOTES
12/12/2022 9:35 AM  Location:[facility] [address] [facility]  [place]  Patient #:  [id number]  YOB: 1960          YOUR LAST HEMOGLOBIN A1CS:   No results found for: HBA1C, [id number]    YOUR LAST LIPID PROFILE:   Lab Results   Component Value Date/Time    CHOL 137 12/07/2020 10:00 AM    HDL 53 12/07/2020 10:00 AM    VLDL 17 12/07/2020 10:00 AM         Lab Results   Component Value Date/Time    GFRAA 71 12/07/2020 10:00 AM    BUN 15 12/07/2020 10:00 AM     12/07/2020 10:00 AM    K 5.0 12/07/2020 10:00 AM     12/07/2020 10:00 AM    CO2 24 12/07/2020 10:00 AM           History of Present Illness     Chief Complaint   Patient presents with    Annual Exam     Pt presents to the office today for their annual exam.      Shortness of Breath     Has echo tomorrow     This patient is here for his annual evaluation. He has been requiring weekly blood transfusions due to his myeloproliferative disorder. He has been started on a new medication, Jadenu. Recent ferritin levels have been increasing significantly as well due to multiple transfusions.   This patient presents for the annual preventive exam. Patient denies any problems other than what is noted  In ROS   Mr. Julio Hoyt is a 57 y.o. male  who presents for annual office visit      No Known Allergies  Past Medical History:   Diagnosis Date    Allergic rhinitis 11/4/2015    Anemia     Constipation 11/4/2015    Depressive disorder 11/4/2015    Gastritis and gastroduodenitis 11/4/2015    Hyperglycemia 11/4/2015    Hyperlipidemia 11/4/2015    MDS (myelodysplastic syndrome) ([facility] [address]     Pain in joint involving lower leg 11/4/2015    Plantar fasciitis 11/4/2015    Splenomegaly 11/4/2015     Social History     Socioeconomic History    Marital status:      Spouse name: None    Number of children: None    Years of education: None    Highest education level: None   Tobacco Use    Smoking status: Never    Smokeless tobacco: Never   Substance and Sexual Activity    Alcohol use: No    Drug use: No     Social Determinants of Health     Financial Resource Strain: Low Risk     Difficulty of Paying Living Expenses: Not hard at all   Food Insecurity: No Food Insecurity    Worried About Running Out of Food in the Last Year: Never true    Royal of Food in the Last Year: Never true     Past Surgical History:   Procedure Laterality Date    ORTHOPEDIC SURGERY Right     fx elbow/ \"replaced radial head\"    VASECTOMY       Current Outpatient Medications   Medication Sig Dispense Refill    deferasirox (JADENU) 360 MG tablet Take 720 mg by mouth daily      Multiple Vitamins-Minerals (MULTIVITAMIN MEN 50+) TABS Take by mouth daily      azelastine (ASTELIN) 0.1 % nasal spray USE 2 SPRAYS IN EACH NOSTRIL TWICE DAILY      buPROPion (WELLBUTRIN XL) 300 MG extended release tablet Take 300 mg by mouth every morning      cetirizine (ZYRTEC) 10 MG tablet Take 10 mg by mouth daily      escitalopram (LEXAPRO) 20 MG tablet Take 20 mg by mouth daily      folic acid (FOLVITE) 1 MG tablet Take 1 mg by mouth daily      mesalamine (LIALDA) 1.2 g EC tablet Take 2 tabs po once a day      omeprazole (PRILOSEC) 40 MG delayed release capsule Take 40 mg by mouth daily      predniSONE (DELTASONE) 5 MG tablet Take 7.5 mg by mouth daily       No current facility-administered medications for this visit.      Health Maintenance   Topic Date Due    COVID-19 Vaccine (1) Never done    Depression Monitoring  Never done    HIV screen  Never done    Hepatitis C screen  Never done    Pneumococcal 0-64 years Vaccine (3 - PPSV23 if available, else PCV20) 12/03/2023    Lipids  12/07/2025    Colorectal Cancer Screen  07/09/2027    DTaP/Tdap/Td vaccine (2 - Td or Tdap) 01/04/2028    Flu vaccine  Completed    Shingles vaccine  Completed    Hepatitis A vaccine  Aged Out    Hib vaccine  Aged Out    Meningococcal (ACWY) vaccine  Aged Out Family History   Problem Relation Age of Onset    Tuberculosis Other     Cancer Father              Review of Systems  Review of Systems    /83 (Site: Left Upper Arm, Position: Sitting, Cuff Size: Large Adult)   Pulse 87   Temp 97.2 °F (36.2 °C) (Temporal)   Resp 18   Ht 6' (1.829 m)   Wt 235 lb 3.2 oz (106.7 kg)   BMI 31.90 kg/m²       Physical Exam    Physical Exam  Constitutional:       General: He is not in acute distress. Appearance: Normal appearance. He is not ill-appearing. Comments: pale   HENT:      Head: Normocephalic and atraumatic. Right Ear: Tympanic membrane and ear canal normal.      Left Ear: Tympanic membrane and ear canal normal.      Nose: Nose normal.      Mouth/Throat:      Mouth: Mucous membranes are dry. Pharynx: Oropharynx is clear. Eyes:      Extraocular Movements: Extraocular movements intact. Conjunctiva/sclera: Conjunctivae normal.      Pupils: Pupils are equal, round, and reactive to light. Cardiovascular:      Rate and Rhythm: Normal rate and regular rhythm. Pulses: Normal pulses. Heart sounds: Normal heart sounds. Pulmonary:      Effort: Pulmonary effort is normal.      Breath sounds: Normal breath sounds. Abdominal:      General: Abdomen is flat. Bowel sounds are normal. There is no distension. Palpations: Abdomen is soft. There is no mass. Tenderness: There is no abdominal tenderness. There is no rebound. Hernia: No hernia is present. Musculoskeletal:         General: Normal range of motion. Skin:     General: Skin is warm. Neurological:      General: No focal deficit present. Mental Status: He is alert and oriented to person, place, and time. Motor: No weakness. Psychiatric:         Mood and Affect: Mood normal.         Behavior: Behavior normal.         Thought Content: Thought content normal.         Judgment: Judgment normal.       Assessment & Plan    Encounter Diagnoses   Name Primary?  Peripheral neuropathy due to chemotherapy (HCC) Yes    Chronic nonintractable headache, unspecified headache type     MDS (myelodysplastic syndrome) (HCC)     Multiple myeloma, remission status unspecified (HCC)     Chronic fatigue     Diverticulosis     Depressive disorder        Current Outpatient Medications   Medication Sig Dispense Refill    deferasirox (JADENU) 360 MG tablet Take 720 mg by mouth daily      Multiple Vitamins-Minerals (MULTIVITAMIN MEN 50+) TABS Take by mouth daily      azelastine (ASTELIN) 0.1 % nasal spray USE 2 SPRAYS IN EACH NOSTRIL TWICE DAILY      buPROPion (WELLBUTRIN XL) 300 MG extended release tablet Take 300 mg by mouth every morning      cetirizine (ZYRTEC) 10 MG tablet Take 10 mg by mouth daily      escitalopram (LEXAPRO) 20 MG tablet Take 20 mg by mouth daily      folic acid (FOLVITE) 1 MG tablet Take 1 mg by mouth daily      mesalamine (LIALDA) 1.2 g EC tablet Take 2 tabs po once a day      omeprazole (PRILOSEC) 40 MG delayed release capsule Take 40 mg by mouth daily      predniSONE (DELTASONE) 5 MG tablet Take 7.5 mg by mouth daily       No current facility-administered medications for this visit. No orders of the defined types were placed in this encounter. Medications Discontinued During This Encounter   Medication Reason    doxycycline hyclate (VIBRAMYCIN) 100 MG capsule LIST CLEANUP       1. Peripheral neuropathy due to chemotherapy [facility])  Somatic followed by oncology    2. Chronic nonintractable headache, unspecified headache type  Still with headaches unchanged    3. MDS (myelodysplastic syndrome) (HCC)  Followed closely by hematology on a new treatment and unfortunately has required weekly transfusions now with evidence of iron overload    4. Multiple myeloma, remission status unspecified ([facility] [address]  Followed by hematology     5. Chronic fatigue  Multifactorial    6. Diverticulosis  Seen and noted by gastroenterology asymptomatic    7.  Depressive disorder  On treatment      No follow-up provider specified.         Hector Mclean MD

## 2022-12-14 LAB — PSA SERPL-MCNC: 1.9 NG/ML

## 2023-02-02 ENCOUNTER — TRANSCRIBE ORDERS (OUTPATIENT)
Dept: SCHEDULING | Age: 63
End: 2023-02-02

## 2023-02-02 DIAGNOSIS — R19.7 ACUTE DIARRHEA: Primary | ICD-10-CM

## 2023-02-02 DIAGNOSIS — R10.84 DIFFUSE ABDOMINAL PAIN: ICD-10-CM

## 2023-02-03 ENCOUNTER — HOSPITAL ENCOUNTER (OUTPATIENT)
Dept: CT IMAGING | Age: 63
Discharge: HOME | End: 2023-02-03
Payer: COMMERCIAL

## 2023-02-03 DIAGNOSIS — R10.84 DIFFUSE ABDOMINAL PAIN: ICD-10-CM

## 2023-02-03 DIAGNOSIS — R19.7 ACUTE DIARRHEA: ICD-10-CM

## 2023-02-03 PROCEDURE — 6360000004 HC RX CONTRAST MEDICATION: Performed by: INTERNAL MEDICINE

## 2023-02-03 PROCEDURE — 74176 CT ABD & PELVIS W/O CONTRAST: CPT

## 2023-02-03 RX ADMIN — DIATRIZOATE MEGLUMINE AND DIATRIZOATE SODIUM 15 ML: 660; 100 LIQUID ORAL; RECTAL at 10:40

## 2023-09-04 SDOH — ECONOMIC STABILITY: FOOD INSECURITY: WITHIN THE PAST 12 MONTHS, YOU WORRIED THAT YOUR FOOD WOULD RUN OUT BEFORE YOU GOT MONEY TO BUY MORE.: PATIENT DECLINED

## 2023-09-04 SDOH — ECONOMIC STABILITY: FOOD INSECURITY: WITHIN THE PAST 12 MONTHS, THE FOOD YOU BOUGHT JUST DIDN'T LAST AND YOU DIDN'T HAVE MONEY TO GET MORE.: PATIENT DECLINED

## 2023-09-04 SDOH — ECONOMIC STABILITY: INCOME INSECURITY: HOW HARD IS IT FOR YOU TO PAY FOR THE VERY BASICS LIKE FOOD, HOUSING, MEDICAL CARE, AND HEATING?: PATIENT DECLINED

## 2023-09-04 SDOH — ECONOMIC STABILITY: TRANSPORTATION INSECURITY
IN THE PAST 12 MONTHS, HAS LACK OF TRANSPORTATION KEPT YOU FROM MEETINGS, WORK, OR FROM GETTING THINGS NEEDED FOR DAILY LIVING?: PATIENT DECLINED

## 2023-09-04 SDOH — ECONOMIC STABILITY: HOUSING INSECURITY
IN THE LAST 12 MONTHS, WAS THERE A TIME WHEN YOU DID NOT HAVE A STEADY PLACE TO SLEEP OR SLEPT IN A SHELTER (INCLUDING NOW)?: PATIENT REFUSED

## 2023-09-04 ASSESSMENT — PATIENT HEALTH QUESTIONNAIRE - PHQ9
8. MOVING OR SPEAKING SO SLOWLY THAT OTHER PEOPLE COULD HAVE NOTICED. OR THE OPPOSITE - BEING SO FIDGETY OR RESTLESS THAT YOU HAVE BEEN MOVING AROUND A LOT MORE THAN USUAL: MORE THAN HALF THE DAYS
9. THOUGHTS THAT YOU WOULD BE BETTER OFF DEAD, OR OF HURTING YOURSELF: 0
SUM OF ALL RESPONSES TO PHQ QUESTIONS 1-9: 11
7. TROUBLE CONCENTRATING ON THINGS, SUCH AS READING THE NEWSPAPER OR WATCHING TELEVISION: MORE THAN HALF THE DAYS
8. MOVING OR SPEAKING SO SLOWLY THAT OTHER PEOPLE COULD HAVE NOTICED. OR THE OPPOSITE, BEING SO FIGETY OR RESTLESS THAT YOU HAVE BEEN MOVING AROUND A LOT MORE THAN USUAL: 2
6. FEELING BAD ABOUT YOURSELF - OR THAT YOU ARE A FAILURE OR HAVE LET YOURSELF OR YOUR FAMILY DOWN: 0
10. IF YOU CHECKED OFF ANY PROBLEMS, HOW DIFFICULT HAVE THESE PROBLEMS MADE IT FOR YOU TO DO YOUR WORK, TAKE CARE OF THINGS AT HOME, OR GET ALONG WITH OTHER PEOPLE: NOT DIFFICULT AT ALL
4. FEELING TIRED OR HAVING LITTLE ENERGY: 3
2. FEELING DOWN, DEPRESSED OR HOPELESS: NOT AT ALL
2. FEELING DOWN, DEPRESSED OR HOPELESS: 0
6. FEELING BAD ABOUT YOURSELF - OR THAT YOU ARE A FAILURE OR HAVE LET YOURSELF OR YOUR FAMILY DOWN: NOT AT ALL
3. TROUBLE FALLING OR STAYING ASLEEP: 3
SUM OF ALL RESPONSES TO PHQ QUESTIONS 1-9: 11
SUM OF ALL RESPONSES TO PHQ QUESTIONS 1-9: 11
1. LITTLE INTEREST OR PLEASURE IN DOING THINGS: NOT AT ALL
10. IF YOU CHECKED OFF ANY PROBLEMS, HOW DIFFICULT HAVE THESE PROBLEMS MADE IT FOR YOU TO DO YOUR WORK, TAKE CARE OF THINGS AT HOME, OR GET ALONG WITH OTHER PEOPLE: 0
SUM OF ALL RESPONSES TO PHQ QUESTIONS 1-9: 11
SUM OF ALL RESPONSES TO PHQ9 QUESTIONS 1 & 2: 0
SUM OF ALL RESPONSES TO PHQ QUESTIONS 1-9: 11
1. LITTLE INTEREST OR PLEASURE IN DOING THINGS: 0
9. THOUGHTS THAT YOU WOULD BE BETTER OFF DEAD, OR OF HURTING YOURSELF: NOT AT ALL
7. TROUBLE CONCENTRATING ON THINGS, SUCH AS READING THE NEWSPAPER OR WATCHING TELEVISION: 2
4. FEELING TIRED OR HAVING LITTLE ENERGY: NEARLY EVERY DAY
3. TROUBLE FALLING OR STAYING ASLEEP: NEARLY EVERY DAY
5. POOR APPETITE OR OVEREATING: SEVERAL DAYS
5. POOR APPETITE OR OVEREATING: 1

## 2023-09-06 ENCOUNTER — OFFICE VISIT (OUTPATIENT)
Dept: INTERNAL MEDICINE CLINIC | Facility: CLINIC | Age: 63
End: 2023-09-06
Payer: COMMERCIAL

## 2023-09-06 VITALS
RESPIRATION RATE: 17 BRPM | BODY MASS INDEX: 31.83 KG/M2 | DIASTOLIC BLOOD PRESSURE: 69 MMHG | HEART RATE: 93 BPM | OXYGEN SATURATION: 98 % | WEIGHT: 235 LBS | SYSTOLIC BLOOD PRESSURE: 107 MMHG | HEIGHT: 72 IN | TEMPERATURE: 97.5 F

## 2023-09-06 DIAGNOSIS — D49.9 PRECANCEROUS LESION: Primary | ICD-10-CM

## 2023-09-06 DIAGNOSIS — D22.9 ATYPICAL MOLE: ICD-10-CM

## 2023-09-06 PROCEDURE — 1123F ACP DISCUSS/DSCN MKR DOCD: CPT | Performed by: NURSE PRACTITIONER

## 2023-09-06 PROCEDURE — 99213 OFFICE O/P EST LOW 20 MIN: CPT | Performed by: NURSE PRACTITIONER

## 2023-09-06 PROCEDURE — 17110 DESTRUCTION B9 LES UP TO 14: CPT | Performed by: NURSE PRACTITIONER

## 2023-09-06 ASSESSMENT — ENCOUNTER SYMPTOMS
COLOR CHANGE: 0
SHORTNESS OF BREATH: 0
BLOOD IN STOOL: 0

## 2023-09-06 NOTE — PATIENT INSTRUCTIONS
[facility]  [address] [facility], [address] [facility]  [phone]

## 2023-09-20 ENCOUNTER — APPOINTMENT (RX ONLY)
Dept: URBAN - METROPOLITAN AREA CLINIC 23 | Facility: CLINIC | Age: 63
Setting detail: DERMATOLOGY
End: 2023-09-20

## 2023-09-20 DIAGNOSIS — D22 MELANOCYTIC NEVI: ICD-10-CM

## 2023-09-20 DIAGNOSIS — B07.8 OTHER VIRAL WARTS: ICD-10-CM

## 2023-09-20 DIAGNOSIS — L82.1 OTHER SEBORRHEIC KERATOSIS: ICD-10-CM

## 2023-09-20 DIAGNOSIS — L57.8 OTHER SKIN CHANGES DUE TO CHRONIC EXPOSURE TO NONIONIZING RADIATION: ICD-10-CM

## 2023-09-20 PROBLEM — D48.5 NEOPLASM OF UNCERTAIN BEHAVIOR OF SKIN: Status: ACTIVE | Noted: 2023-09-20

## 2023-09-20 PROCEDURE — ? LIQUID NITROGEN

## 2023-09-20 PROCEDURE — ? OTC TREATMENT REGIMEN

## 2023-09-20 PROCEDURE — 99203 OFFICE O/P NEW LOW 30 MIN: CPT | Mod: 25

## 2023-09-20 PROCEDURE — ? SHAVE REMOVAL

## 2023-09-20 PROCEDURE — 11305 SHAVE SKIN LESION 0.5 CM/<: CPT | Mod: 59

## 2023-09-20 PROCEDURE — ? COUNSELING

## 2023-09-20 PROCEDURE — 17110 DESTRUCTION B9 LES UP TO 14: CPT

## 2023-09-20 ASSESSMENT — LOCATION DETAILED DESCRIPTION DERM
LOCATION DETAILED: MID-OCCIPITAL SCALP
LOCATION DETAILED: LEFT LATERAL SUPERIOR CHEST
LOCATION DETAILED: POSTERIOR MID-PARIETAL SCALP
LOCATION DETAILED: LEFT ANTERIOR DISTAL UPPER ARM
LOCATION DETAILED: PERIUNGUAL SKIN RIGHT INDEX FINGER
LOCATION DETAILED: RIGHT ELBOW

## 2023-09-20 ASSESSMENT — LOCATION SIMPLE DESCRIPTION DERM
LOCATION SIMPLE: RIGHT INDEX FINGER
LOCATION SIMPLE: POSTERIOR SCALP
LOCATION SIMPLE: LEFT UPPER ARM
LOCATION SIMPLE: RIGHT ELBOW
LOCATION SIMPLE: CHEST

## 2023-09-20 ASSESSMENT — LOCATION ZONE DERM
LOCATION ZONE: SCALP
LOCATION ZONE: TRUNK
LOCATION ZONE: ARM
LOCATION ZONE: FINGER

## 2023-09-20 NOTE — PROCEDURE: OTC TREATMENT REGIMEN
Detail Level: Zone
Patient Specific Otc Recommendations (Will Not Stick From Patient To Patient): Sample of Mediplast was provided for patient to apply to wart on finger and cover with duct tape. Repeat ever couple of days for 6 weeks.

## 2023-09-20 NOTE — PROCEDURE: SHAVE REMOVAL
Medical Necessity Information: It is in your best interest to select a reason for this procedure from the list below. All of these items fulfill various CMS LCD requirements except the new and changing color options.
Medical Necessity Clause: This procedure was medically necessary because the lesion that was treated was:
Lab: [id number]
Lab Facility: [id number]
Detail Level: Detailed
Was A Bandage Applied: Yes
Size Of Lesion In Cm (Required): 0.5
X Size Of Lesion In Cm (Optional): 0
Depth Of Shave: dermis
Biopsy Method: Dermablade
Anesthesia Type: 1% lidocaine with epinephrine
Hemostasis: Drysol
Wound Care: Petrolatum
Render Path Notes In Note?: No
Consent was obtained from the patient. The risks and benefits to therapy were discussed in detail. Specifically, the risks of infection, scarring, bleeding, prolonged wound healing, incomplete removal, allergy to anesthesia, nerve injury and recurrence were addressed. Prior to the procedure, the treatment site was clearly identified and confirmed by the patient. All components of Universal Protocol/PAUSE Rule completed.
Post-Care Instructions: I reviewed with the patient in detail post-care instructions. Patient is to keep the biopsy site dry overnight, and then apply bacitracin twice daily until healed. Patient may apply hydrogen peroxide soaks to remove any crusting.
Notification Instructions: Patient will be notified of pathology results. However, patient instructed to call the office if not contacted within 2 weeks.
Billing Type: Third-Party Bill

## 2023-11-01 ENCOUNTER — OFFICE VISIT (OUTPATIENT)
Dept: INTERNAL MEDICINE CLINIC | Facility: CLINIC | Age: 63
End: 2023-11-01
Payer: COMMERCIAL

## 2023-11-01 VITALS
OXYGEN SATURATION: 100 % | HEIGHT: 72 IN | BODY MASS INDEX: 32.78 KG/M2 | TEMPERATURE: 97.6 F | WEIGHT: 242 LBS | SYSTOLIC BLOOD PRESSURE: 144 MMHG | DIASTOLIC BLOOD PRESSURE: 86 MMHG | HEART RATE: 64 BPM | RESPIRATION RATE: 18 BRPM

## 2023-11-01 DIAGNOSIS — E87.79 OTHER HYPERVOLEMIA: Primary | ICD-10-CM

## 2023-11-01 LAB
ALBUMIN SERPL-MCNC: 3.5 G/DL (ref 3.2–4.6)
ALBUMIN/GLOB SERPL: 1.5 (ref 0.4–1.6)
ALP SERPL-CCNC: 118 U/L (ref 50–136)
ALT SERPL-CCNC: 246 U/L (ref 12–65)
ANION GAP SERPL CALC-SCNC: 8 MMOL/L (ref 2–11)
AST SERPL-CCNC: 72 U/L (ref 15–37)
BILIRUB SERPL-MCNC: 0.7 MG/DL (ref 0.2–1.1)
BUN SERPL-MCNC: 27 MG/DL (ref 8–23)
CALCIUM SERPL-MCNC: 8.4 MG/DL (ref 8.3–10.4)
CHLORIDE SERPL-SCNC: 112 MMOL/L (ref 101–110)
CO2 SERPL-SCNC: 25 MMOL/L (ref 21–32)
CREAT SERPL-MCNC: 1.3 MG/DL (ref 0.8–1.5)
GLOBULIN SER CALC-MCNC: 2.3 G/DL (ref 2.8–4.5)
GLUCOSE SERPL-MCNC: 124 MG/DL (ref 65–100)
NT PRO BNP: 425 PG/ML (ref 5–125)
POTASSIUM SERPL-SCNC: 3.9 MMOL/L (ref 3.5–5.1)
PROT SERPL-MCNC: 5.8 G/DL (ref 6.3–8.2)
SODIUM SERPL-SCNC: 145 MMOL/L (ref 133–143)
TSH W FREE THYROID IF ABNORMAL: 3.07 UIU/ML (ref 0.36–3.74)

## 2023-11-01 PROCEDURE — 1123F ACP DISCUSS/DSCN MKR DOCD: CPT | Performed by: NURSE PRACTITIONER

## 2023-11-01 PROCEDURE — 99214 OFFICE O/P EST MOD 30 MIN: CPT | Performed by: NURSE PRACTITIONER

## 2023-11-01 RX ORDER — ACETAMINOPHEN 500 MG
500 TABLET ORAL EVERY 6 HOURS PRN
COMMUNITY

## 2023-11-01 RX ORDER — BUDESONIDE 3 MG/1
6 CAPSULE, COATED PELLETS ORAL EVERY MORNING
COMMUNITY

## 2023-11-01 RX ORDER — POTASSIUM CHLORIDE 750 MG/1
10 TABLET, EXTENDED RELEASE ORAL 2 TIMES DAILY
COMMUNITY

## 2023-11-01 RX ORDER — FUROSEMIDE 20 MG/1
20 TABLET ORAL 2 TIMES DAILY
COMMUNITY

## 2023-11-01 RX ORDER — POSACONAZOLE 100 MG/1
300 TABLET, DELAYED RELEASE ORAL
COMMUNITY

## 2023-11-01 RX ORDER — TRAMADOL HYDROCHLORIDE 50 MG/1
50 TABLET ORAL EVERY 6 HOURS PRN
COMMUNITY

## 2023-11-01 ASSESSMENT — ENCOUNTER SYMPTOMS
NAUSEA: 0
WHEEZING: 0
ABDOMINAL PAIN: 0
SHORTNESS OF BREATH: 0
VOMITING: 0
CONSTIPATION: 1
CHEST TIGHTNESS: 0
DIARRHEA: 1
ABDOMINAL DISTENTION: 0

## 2023-11-26 ENCOUNTER — PATIENT MESSAGE (OUTPATIENT)
Dept: INTERNAL MEDICINE CLINIC | Facility: CLINIC | Age: 63
End: 2023-11-26

## 2023-11-27 ENCOUNTER — TELEMEDICINE (OUTPATIENT)
Dept: INTERNAL MEDICINE CLINIC | Facility: CLINIC | Age: 63
End: 2023-11-27
Payer: COMMERCIAL

## 2023-11-27 ENCOUNTER — TELEPHONE (OUTPATIENT)
Dept: INTERNAL MEDICINE CLINIC | Facility: CLINIC | Age: 63
End: 2023-11-27

## 2023-11-27 DIAGNOSIS — J01.00 ACUTE NON-RECURRENT MAXILLARY SINUSITIS: Primary | ICD-10-CM

## 2023-11-27 DIAGNOSIS — M79.89 LEG SWELLING: ICD-10-CM

## 2023-11-27 DIAGNOSIS — I10 PRIMARY HYPERTENSION: ICD-10-CM

## 2023-11-27 PROCEDURE — 99443 PR PHYS/QHP TELEPHONE EVALUATION 21-30 MIN: CPT | Performed by: NURSE PRACTITIONER

## 2023-11-27 RX ORDER — DOXYCYCLINE HYCLATE 100 MG
100 TABLET ORAL 2 TIMES DAILY
Qty: 14 TABLET | Refills: 0 | Status: SHIPPED | OUTPATIENT
Start: 2023-11-27 | End: 2023-12-04

## 2023-11-27 ASSESSMENT — ENCOUNTER SYMPTOMS
SINUS PAIN: 1
SHORTNESS OF BREATH: 0
SORE THROAT: 1
COUGH: 1
NAUSEA: 0
VOICE CHANGE: 0
TROUBLE SWALLOWING: 1
VOMITING: 0
WHEEZING: 0
SINUS PRESSURE: 1

## 2023-11-27 NOTE — PROGRESS NOTES
Curt Alfaro, was evaluated through a synchronous (real-time) audio-video encounter. The patient (or guardian if applicable) is aware that this is a billable service, which includes applicable co-pays. This Virtual Visit was conducted with patient's (and/or legal guardian's) consent. Patient identification was verified, and a caregiver was present when appropriate. The patient was located at Home: [address]  [address] [address]  Provider was located at [facility] (Appt Dept): [facility],  [facility] [place] [facility] [place] [address]      Curt Alfaro (:  1960) is a Established patient, presenting virtually for evaluation of the following:    Assessment & Plan   Below is the assessment and plan developed based on review of pertinent history, physical exam, labs, studies, and medications. 1. Acute non-recurrent maxillary sinusitis  Continue Astelin, add Flonase/Claritin. Will defer Covid testing as he is outside of the window for Paxlovid, though urged him to test early for any URI sx moving forward. Discussed continued symptomatic care. Knows to call for any new or worsening symptoms.  - doxycycline hyclate (VIBRA-TABS) 100 MG tablet; Take 1 tablet by mouth 2 times daily for 7 days  Dispense: 14 tablet; Refill: 0    2. Leg swelling  Discussed that per the oncologist notes he was to increase his Lasix to 60 mg daily. Reviewed echo. Keep an eye on blood pressures, urine output and he does have follow-up with oncology in 2 days. Will send for better fitting compressive hose, rule out DVT. Consider changing loop diuretics to torsemide if needed. Will defer to oncology. - DME SUPPLY ORDER  - Vascular duplex lower extremity venous bilateral; Future    3. Primary hypertension  Consider adding ARB therapy if needed, he will continue home readings and in the meantime escalate his loop diuretic to 60 mg of Lasix daily as instructed. Has upcoming follow-up with Dr. Feliciano Speaker.         Subjective   This is

## 2023-11-27 NOTE — TELEPHONE ENCOUNTER
Called and informed pt that CMS is out of the office on Mondays and no appts available and he will need to go to an urgent care to be checked out for the URI symptoms. Offered pt the first available appt with [facility] on Thursday but he declined.   Pt declined to go to urgent care and said he will see his oncologist

## 2023-11-27 NOTE — TELEPHONE ENCOUNTER
Can we send order for compression hose 15 mm to get him fitted? Also, I ordered vascular studies rule out DVT with his leg swelling, please call patient and see where he would like to go for this. Thanks!   Mica

## 2023-12-04 ENCOUNTER — TELEPHONE (OUTPATIENT)
Dept: INTERNAL MEDICINE CLINIC | Facility: CLINIC | Age: 63
End: 2023-12-04

## 2023-12-04 NOTE — TELEPHONE ENCOUNTER
Patient said he is still experiencing a painful sore throat, and he has completed all his antibiotics    Wants to know what else he can do ?

## 2023-12-04 NOTE — TELEPHONE ENCOUNTER
Patient green drainage, sore throat, and pressure behind eyes and forehead.    He has a compromised immune system due to past cancer

## 2023-12-05 ENCOUNTER — OFFICE VISIT (OUTPATIENT)
Dept: INTERNAL MEDICINE CLINIC | Facility: CLINIC | Age: 63
End: 2023-12-05
Payer: COMMERCIAL

## 2023-12-05 VITALS
HEART RATE: 69 BPM | SYSTOLIC BLOOD PRESSURE: 104 MMHG | BODY MASS INDEX: 30.48 KG/M2 | DIASTOLIC BLOOD PRESSURE: 85 MMHG | OXYGEN SATURATION: 99 % | RESPIRATION RATE: 20 BRPM | TEMPERATURE: 97.2 F | WEIGHT: 225 LBS | HEIGHT: 72 IN

## 2023-12-05 DIAGNOSIS — J06.9 UPPER RESPIRATORY TRACT INFECTION, UNSPECIFIED TYPE: Primary | ICD-10-CM

## 2023-12-05 PROCEDURE — 1123F ACP DISCUSS/DSCN MKR DOCD: CPT | Performed by: INTERNAL MEDICINE

## 2023-12-05 PROCEDURE — 99213 OFFICE O/P EST LOW 20 MIN: CPT | Performed by: INTERNAL MEDICINE

## 2023-12-05 RX ORDER — AMOXICILLIN AND CLAVULANATE POTASSIUM 875; 125 MG/1; MG/1
1 TABLET, FILM COATED ORAL 2 TIMES DAILY
Qty: 14 TABLET | Refills: 0 | Status: SHIPPED | OUTPATIENT
Start: 2023-12-05 | End: 2023-12-12

## 2023-12-06 ENCOUNTER — HOSPITAL ENCOUNTER (OUTPATIENT)
Dept: ULTRASOUND IMAGING | Age: 63
Discharge: HOME | End: 2023-12-09
Payer: COMMERCIAL

## 2023-12-06 DIAGNOSIS — M79.89 LEG SWELLING: ICD-10-CM

## 2023-12-06 PROCEDURE — 93970 EXTREMITY STUDY: CPT
